# Patient Record
Sex: FEMALE | Race: WHITE | NOT HISPANIC OR LATINO | Employment: STUDENT | URBAN - METROPOLITAN AREA
[De-identification: names, ages, dates, MRNs, and addresses within clinical notes are randomized per-mention and may not be internally consistent; named-entity substitution may affect disease eponyms.]

---
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
## 2023-05-02 ENCOUNTER — TELEPHONE (OUTPATIENT)
Dept: OBSTETRICS AND GYNECOLOGY | Age: 18
End: 2023-05-02
Payer: COMMERCIAL

## 2023-05-02 NOTE — TELEPHONE ENCOUNTER
Regarding: Pregnant and on Zoloft   Contact: [phone]  ----- Message from Amanda Spencer sent at 5/2/2023  8:11 AM EDT -----       ----- Message from Marce Galvan to Glenis Bautista MD sent at 5/1/2023  5:51 PM -----   Hey, I just found out I’m pregnant with baby number two. I got prescribed Zoloft after my first because I was dealing with depression and I’m still on it. I’m wondering what are the risks that come with that. I’m worried and want to know all I can. If the benefits outweigh the risk, I would like to stay on it because I’ve had severe anxiety most of my life. And being on medication has really change the quality of my life.

## 2023-05-04 ENCOUNTER — TELEPHONE (OUTPATIENT)
Dept: OBSTETRICS AND GYNECOLOGY | Age: 18
End: 2023-05-04
Payer: COMMERCIAL

## 2023-05-04 NOTE — TELEPHONE ENCOUNTER
Michele Pt    Pt calling stating she has a positive HPT and unknown date of lmp. Please advise for scheduling

## 2023-06-07 ENCOUNTER — OFFICE VISIT (OUTPATIENT)
Dept: OBSTETRICS AND GYNECOLOGY | Age: 18
End: 2023-06-07
Payer: COMMERCIAL

## 2023-06-07 VITALS
SYSTOLIC BLOOD PRESSURE: 128 MMHG | BODY MASS INDEX: 37.45 KG/M2 | DIASTOLIC BLOOD PRESSURE: 74 MMHG | WEIGHT: 233 LBS | HEIGHT: 66 IN

## 2023-06-07 DIAGNOSIS — Z98.891 H/O: C-SECTION: ICD-10-CM

## 2023-06-07 DIAGNOSIS — Z34.90 EARLY STAGE OF PREGNANCY: Primary | ICD-10-CM

## 2023-06-07 RX ORDER — FEXOFENADINE HYDROCHLORIDE 60 MG/1
60 TABLET, FILM COATED ORAL DAILY
COMMUNITY

## 2023-06-07 RX ORDER — DOXYLAMINE SUCCINATE AND PYRIDOXINE HYDROCHLORIDE 20; 20 MG/1; MG/1
1 TABLET, EXTENDED RELEASE ORAL 2 TIMES DAILY
Qty: 60 TABLET | Refills: 1 | Status: SHIPPED | OUTPATIENT
Start: 2023-06-07

## 2023-06-07 NOTE — Clinical Note
I'm not sure what to make of the finding in her u/s, I suggested a rpt u/s in 2 wks with her new ob visit for re evaluation. Lmk if there is anything else you would suggest

## 2023-06-07 NOTE — PROGRESS NOTES
"Subjective     Chief Complaint   Patient presents with    Possible Pregnancy     pregnancy confirmation with ultrasound (unsure of dates) ovulation predictor date was 2023       Marce Galvan is a 18 y.o.  whose LMP is No LMP recorded (lmp unknown). Patient is pregnant. presents for early ob scan    Pt of Dr Bautista  Previous pt of Dr Gill    Had a +ovulation test on , +UPT on the   She has only had one period since delivering her son in 2022  That pregnancy was uneventful but a csection was done d/t failure to progress  Placenta pathology showed chorioamnionitis, meconium and an occult abruption   SCANNED PATHOLOGY (2022)     She has no significant med history  Is accompanied by JJ and her son    Has some nausea and requests medication  Is taking zoloft and has discussed this with Dr Bautista    No Additional Complaints Reported    The following portions of the patient's history were reviewed and updated as appropriate:no additional history reviewed, vital signs, allergies, current medications, past medical history, past social history, past surgical history, and problem list      Review of Systems   Pertinent items are noted in HPI.     Objective      /74   Ht 167.6 cm (66\")   Wt 106 kg (233 lb)   LMP  (LMP Unknown)   Breastfeeding Yes   BMI 37.61 kg/m²     Physical Exam    General:   alert, comfortable, and no distress   Heart: Not performed today   Lungs: Not performed today.   Breast: Not performed today   Neck: Not performed today   Abdomen: Not performed today   CVA: Not performed today   Pelvis: Not performed today   Extremities: Not performed today   Neurologic: Not performed today   Psychiatric: Normal affect, judgement, and mood       Lab Review   Labs: No data reviewed    Imaging   Ultrasound - Pelvic Vaginal    Intrauterine pregnancy at Unknown  Fetal heart rate: 177  Basis for EDC: Ultrasound   FRITZ is  1/10/2024  Left ovary with likely CL noted that measures 3.8 " cm  Hyperechoic area with vascularity seen near DENISHA and cervix    Assessment & Plan     ASSESSMENT  1. Early stage of pregnancy    2. H/O:           PLAN  1.   Orders Placed This Encounter   Procedures    Urine Culture - , Urine, Random Void       2. Medications prescribed this encounter:        New Medications Ordered This Visit   Medications    sertraline (Zoloft) 50 MG tablet     Sig: Take 1 tablet by mouth Every Night.     Dispense:  30 tablet     Refill:  12    Doxylamine-Pyridoxine ER (Bonjesta) 20-20 MG tablet controlled-release     Sig: Take 1 tablet by mouth 2 (Two) Times a Day.     Dispense:  60 tablet     Refill:  1       3. Refilled zoloft and sent in bonjesta. Disc small, frequent meals. New ob labs will be done anv with  her NIPS testing. Urine culture sent today. 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 No real restrictions from activity needed at this time unless pt notes back pain or other issues. New ob folder given. Will plan rpt pelvic u/s with next visit as well  Pap not indicated    Follow up: 2 week(s)    RADHA Rayo  2023

## 2023-06-09 LAB
BACTERIA UR CULT: NORMAL
BACTERIA UR CULT: NORMAL

## 2023-06-23 PROBLEM — Z34.90 PREGNANCY: Status: ACTIVE | Noted: 2023-06-23

## 2023-06-23 PROBLEM — Z87.59 HISTORY OF OLIGOHYDRAMNIOS: Status: ACTIVE | Noted: 2023-06-23

## 2023-06-23 PROBLEM — Z98.891 PREVIOUS CESAREAN SECTION: Status: ACTIVE | Noted: 2023-06-23

## 2023-07-24 ENCOUNTER — ROUTINE PRENATAL (OUTPATIENT)
Dept: OBSTETRICS AND GYNECOLOGY | Age: 18
End: 2023-07-24
Payer: COMMERCIAL

## 2023-07-24 VITALS — DIASTOLIC BLOOD PRESSURE: 70 MMHG | SYSTOLIC BLOOD PRESSURE: 122 MMHG | BODY MASS INDEX: 37.28 KG/M2 | WEIGHT: 231 LBS

## 2023-07-24 DIAGNOSIS — Z98.891 PREVIOUS CESAREAN SECTION: ICD-10-CM

## 2023-07-24 DIAGNOSIS — Z13.89 SCREENING FOR BLOOD OR PROTEIN IN URINE: ICD-10-CM

## 2023-07-24 DIAGNOSIS — Z87.59 HISTORY OF OLIGOHYDRAMNIOS: ICD-10-CM

## 2023-07-24 DIAGNOSIS — Z34.82 PRENATAL CARE, SUBSEQUENT PREGNANCY IN SECOND TRIMESTER: Primary | ICD-10-CM

## 2023-07-24 LAB
BILIRUB BLD-MCNC: NEGATIVE MG/DL
GLUCOSE UR STRIP-MCNC: NEGATIVE MG/DL
KETONES UR QL: NEGATIVE
LEUKOCYTE EST, POC: NEGATIVE
NITRITE UR-MCNC: NEGATIVE MG/ML
PH UR: 6 [PH] (ref 5–8)
PROT UR STRIP-MCNC: NEGATIVE MG/DL
RBC # UR STRIP: NEGATIVE /UL
SP GR UR: 1.01 (ref 1–1.03)
UROBILINOGEN UR QL: NORMAL

## 2023-07-24 PROCEDURE — 0502F SUBSEQUENT PRENATAL CARE: CPT | Performed by: OBSTETRICS & GYNECOLOGY

## 2023-07-24 RX ORDER — DOXYLAMINE SUCCINATE AND PYRIDOXINE HYDROCHLORIDE 20; 20 MG/1; MG/1
1 TABLET, EXTENDED RELEASE ORAL EVERY 12 HOURS SCHEDULED
COMMUNITY
Start: 2023-07-07

## 2023-07-24 NOTE — PROGRESS NOTES
Chief Complaint   Patient presents with    Routine Prenatal Visit     Cc: ob check , patient doing well , knows the gender , no ob complaints        HPI: 18 y.o.  at 15w5d presents for prenatal care     Vitals:    23 0854   BP: 122/70   Weight: 105 kg (231 lb)     Total weight gain for pregnancy:  0.781 kg (1 lb 11.5 oz)    Review of systems:   Gen: negative  CV:     negative  GI: negative  :   negative  MS:    negative  Neuro: negative  Pul: negative    A/P  1. Intrauterine pregnancy at 15w5d   2. Pregnancy Risk:  NORMAL        Diagnoses and all orders for this visit:    1. Prenatal care, subsequent pregnancy in second trimester (Primary)    2. Screening for blood or protein in urine  -     POC Urinalysis Dipstick    3. History of oligohydramnios    4. Previous  section        Nutrition and weight gain were addressed.  Discussed 46% chance of success according to  calculator   -----------------------  PLAN:   4 weeks with anatomy US   Prior  -  consent given for review   NIPt LR       Glenis Bautista MD  2023 18:12 EDT

## 2023-07-25 ENCOUNTER — TELEPHONE (OUTPATIENT)
Dept: OBSTETRICS AND GYNECOLOGY | Age: 18
End: 2023-07-25
Payer: COMMERCIAL

## 2023-07-25 PROBLEM — O98.512 COVID-19 AFFECTING PREGNANCY IN SECOND TRIMESTER: Status: ACTIVE | Noted: 2023-07-25

## 2023-07-25 PROBLEM — U07.1 COVID-19 AFFECTING PREGNANCY IN SECOND TRIMESTER: Status: ACTIVE | Noted: 2023-07-25

## 2023-07-25 NOTE — TELEPHONE ENCOUNTER
----- Message from Deepali Ramirez sent at 7/25/2023  1:12 PM EDT -----  Regarding: FW: Covid   Contact: [phone]  Please see pt msg. Thanks  ----- Message -----  From: Marce Galvan  Sent: 7/25/2023  11:10 AM EDT  To: [facility]  Subject: Covid                                            Hey, I have a fever that started last night so I took a covid test this morning and it’s positive. Since I’m pregnant what are things I need to look out for

## 2023-08-21 ENCOUNTER — ROUTINE PRENATAL (OUTPATIENT)
Dept: OBSTETRICS AND GYNECOLOGY | Age: 18
End: 2023-08-21
Payer: COMMERCIAL

## 2023-08-21 VITALS — SYSTOLIC BLOOD PRESSURE: 120 MMHG | WEIGHT: 230 LBS | BODY MASS INDEX: 37.12 KG/M2 | DIASTOLIC BLOOD PRESSURE: 68 MMHG

## 2023-08-21 DIAGNOSIS — O98.512 COVID-19 AFFECTING PREGNANCY IN SECOND TRIMESTER: ICD-10-CM

## 2023-08-21 DIAGNOSIS — U07.1 COVID-19 AFFECTING PREGNANCY IN SECOND TRIMESTER: ICD-10-CM

## 2023-08-21 DIAGNOSIS — O28.3 ABNORMAL FETAL ULTRASOUND: ICD-10-CM

## 2023-08-21 DIAGNOSIS — Z87.59 HISTORY OF PLACENTAL ABRUPTION: ICD-10-CM

## 2023-08-21 DIAGNOSIS — Z13.89 SCREENING FOR BLOOD OR PROTEIN IN URINE: ICD-10-CM

## 2023-08-21 DIAGNOSIS — Z98.891 PREVIOUS CESAREAN SECTION: ICD-10-CM

## 2023-08-21 DIAGNOSIS — Z34.82 PRENATAL CARE, SUBSEQUENT PREGNANCY IN SECOND TRIMESTER: Primary | ICD-10-CM

## 2023-08-21 LAB
BILIRUB BLD-MCNC: NEGATIVE MG/DL
GLUCOSE UR STRIP-MCNC: NEGATIVE MG/DL
KETONES UR QL: NEGATIVE
LEUKOCYTE EST, POC: NEGATIVE
NITRITE UR-MCNC: NEGATIVE MG/ML
PH UR: 7 [PH] (ref 5–8)
PROT UR STRIP-MCNC: ABNORMAL MG/DL
RBC # UR STRIP: NEGATIVE /UL
SP GR UR: 1.02 (ref 1–1.03)
UROBILINOGEN UR QL: ABNORMAL

## 2023-08-21 PROCEDURE — 0502F SUBSEQUENT PRENATAL CARE: CPT | Performed by: OBSTETRICS & GYNECOLOGY

## 2023-08-21 RX ORDER — ASPIRIN 81 MG/1
81 TABLET ORAL DAILY
Qty: 90 TABLET | Refills: 3 | Status: SHIPPED | OUTPATIENT
Start: 2023-08-21

## 2023-08-21 NOTE — PROGRESS NOTES
Chief Complaint   Patient presents with    Routine Prenatal Visit     Cc: ob check with anatomy scan today , no ob complaints        HPI: 18 y.o.  at 19w5d presents for prenatal care      Vitals:    23 0835   BP: 120/68   Weight: 104 kg (230 lb)     Total weight gain for pregnancy:  0.327 kg (11.5 oz)    Review of systems:   Gen: negative  CV:     negative  GI: negative  :   negative and good fetal movement noted   MS:    negative  Neuro: negative  Pul: negative    A/P  1. Intrauterine pregnancy at 19w5d   2. Pregnancy Risk:  HIGH RISK        Diagnoses and all orders for this visit:    1. Prenatal care, subsequent pregnancy in second trimester (Primary)    2. Screening for blood or protein in urine  -     POC Urinalysis Dipstick    3. Previous  section    4. Incomplete fetal anatomy    5. History of placental abruption    6. COVID-19 affecting pregnancy in second trimester  -     aspirin 81 MG EC tablet; Take 1 tablet by mouth Daily.  Dispense: 90 tablet; Refill: 3        Nutrition and weight gain were addressed.  -----------------------  PLAN:   Return in about 4 weeks (around 2023), or ob check and repeat anatomy.  No lifting > 25 pounds   Work inside extreme heat   Prior  - considering        Glenis Bautista MD  2023 08:51 EDT

## 2023-09-18 ENCOUNTER — ROUTINE PRENATAL (OUTPATIENT)
Dept: OBSTETRICS AND GYNECOLOGY | Age: 18
End: 2023-09-18
Payer: COMMERCIAL

## 2023-09-18 VITALS — DIASTOLIC BLOOD PRESSURE: 80 MMHG | BODY MASS INDEX: 37.45 KG/M2 | WEIGHT: 232 LBS | SYSTOLIC BLOOD PRESSURE: 125 MMHG

## 2023-09-18 DIAGNOSIS — Z87.59 HISTORY OF PLACENTAL ABRUPTION: ICD-10-CM

## 2023-09-18 DIAGNOSIS — Z34.82 PRENATAL CARE, SUBSEQUENT PREGNANCY IN SECOND TRIMESTER: Primary | ICD-10-CM

## 2023-09-18 DIAGNOSIS — Z87.59 HISTORY OF OLIGOHYDRAMNIOS: ICD-10-CM

## 2023-09-18 DIAGNOSIS — Z98.891 PREVIOUS CESAREAN SECTION: ICD-10-CM

## 2023-09-18 DIAGNOSIS — Z13.89 SCREENING FOR BLOOD OR PROTEIN IN URINE: ICD-10-CM

## 2023-09-18 DIAGNOSIS — O28.3 ABNORMAL FETAL ULTRASOUND: ICD-10-CM

## 2023-09-18 LAB
BILIRUB BLD-MCNC: NEGATIVE MG/DL
GLUCOSE UR STRIP-MCNC: NEGATIVE MG/DL
KETONES UR QL: NEGATIVE
LEUKOCYTE EST, POC: NEGATIVE
NITRITE UR-MCNC: NEGATIVE MG/ML
PH UR: 7 [PH] (ref 5–8)
PROT UR STRIP-MCNC: NEGATIVE MG/DL
RBC # UR STRIP: ABNORMAL /UL
SP GR UR: 1.01 (ref 1–1.03)
UROBILINOGEN UR QL: NORMAL

## 2023-09-18 NOTE — PROGRESS NOTES
Chief Complaint   Patient presents with    Routine Prenatal Visit     Cc: ob check with repeat anatomy today , some menstrual cramps occasionally no spotting or other problems        HPI: 18 y.o.  at 23w5d presents for prenatal care     Vitals:    23 0844   BP: 125/80   Weight: 105 kg (232 lb)     Total weight gain for pregnancy:  1.235 kg (2 lb 11.5 oz)    Review of systems:   Gen: negative  CV:     negative  GI: negative  :   good fetal movement noted  and pelvic cramping  MS:    negative  Neuro: negative  Pul: negative    A/P  1. Intrauterine pregnancy at 23w5d   2. Pregnancy Risk:  HIGH RISK        Diagnoses and all orders for this visit:    1. Prenatal care, subsequent pregnancy in second trimester (Primary)    2. Screening for blood or protein in urine  -     POC Urinalysis Dipstick    3. History of oligohydramnios    4. History of placental abruption    5. Incomplete fetal anatomy    6. Previous  section        Nutrition and weight gain were addressed.  -----------------------  PLAN:   Return in about 1 month (around 10/20/2023), or ob check, repeat anatomy, one-hour gtt.  Still with incomplete anatomy today.  Repeat 4 weeks  Normal fetal growth today.    Glenis Bautista MD  2023 09:15 EDT

## 2023-10-20 ENCOUNTER — ROUTINE PRENATAL (OUTPATIENT)
Dept: OBSTETRICS AND GYNECOLOGY | Age: 18
End: 2023-10-20
Payer: COMMERCIAL

## 2023-10-20 VITALS — BODY MASS INDEX: 37.61 KG/M2 | WEIGHT: 233 LBS | SYSTOLIC BLOOD PRESSURE: 120 MMHG | DIASTOLIC BLOOD PRESSURE: 62 MMHG

## 2023-10-20 DIAGNOSIS — Z13.1 SCREENING FOR DIABETES MELLITUS: ICD-10-CM

## 2023-10-20 DIAGNOSIS — U07.1 COVID-19 AFFECTING PREGNANCY IN SECOND TRIMESTER: ICD-10-CM

## 2023-10-20 DIAGNOSIS — Z87.59 HISTORY OF PLACENTAL ABRUPTION: ICD-10-CM

## 2023-10-20 DIAGNOSIS — Z98.891 PREVIOUS CESAREAN SECTION: ICD-10-CM

## 2023-10-20 DIAGNOSIS — O98.512 COVID-19 AFFECTING PREGNANCY IN SECOND TRIMESTER: ICD-10-CM

## 2023-10-20 DIAGNOSIS — Z34.83 PRENATAL CARE, SUBSEQUENT PREGNANCY IN THIRD TRIMESTER: Primary | ICD-10-CM

## 2023-10-20 DIAGNOSIS — O28.3 ABNORMAL FETAL ULTRASOUND: ICD-10-CM

## 2023-10-20 DIAGNOSIS — Z13.89 SCREENING FOR BLOOD OR PROTEIN IN URINE: ICD-10-CM

## 2023-10-20 DIAGNOSIS — Z87.59 HISTORY OF OLIGOHYDRAMNIOS: ICD-10-CM

## 2023-10-20 DIAGNOSIS — Z13.0 SCREENING FOR IRON DEFICIENCY ANEMIA: ICD-10-CM

## 2023-10-20 DIAGNOSIS — Z23 ENCOUNTER FOR ADMINISTRATION OF VACCINE: ICD-10-CM

## 2023-10-20 LAB
BILIRUB BLD-MCNC: NEGATIVE MG/DL
ERYTHROCYTE [DISTWIDTH] IN BLOOD BY AUTOMATED COUNT: 12.9 % (ref 12.3–15.4)
GLUCOSE 1H P 50 G GLC PO SERPL-MCNC: 127 MG/DL (ref 65–139)
GLUCOSE UR STRIP-MCNC: NEGATIVE MG/DL
HCT VFR BLD AUTO: 35.6 % (ref 34–46.6)
HGB BLD-MCNC: 12 G/DL (ref 12–15.9)
KETONES UR QL: NEGATIVE
LEUKOCYTE EST, POC: NEGATIVE
MCH RBC QN AUTO: 29.1 PG (ref 26.6–33)
MCHC RBC AUTO-ENTMCNC: 33.7 G/DL (ref 31.5–35.7)
MCV RBC AUTO: 86.4 FL (ref 79–97)
NITRITE UR-MCNC: NEGATIVE MG/ML
PH UR: 6.5 [PH] (ref 5–8)
PLATELET # BLD AUTO: 286 10*3/MM3 (ref 140–450)
PROT UR STRIP-MCNC: NEGATIVE MG/DL
RBC # BLD AUTO: 4.12 10*6/MM3 (ref 3.77–5.28)
RBC # UR STRIP: NEGATIVE /UL
SP GR UR: 1.02 (ref 1–1.03)
TSH SERPL DL<=0.005 MIU/L-ACNC: 1.29 UIU/ML (ref 0.27–4.2)
UROBILINOGEN UR QL: NORMAL
WBC # BLD AUTO: 7.28 10*3/MM3 (ref 3.4–10.8)

## 2023-10-20 NOTE — PROGRESS NOTES
Chief Complaint   Patient presents with    Routine Prenatal Visit     Cc: ob check with repeat anatomy 1 hr gtt she will hold off on tdap since she got one in 2023 , flu vaccine today , pt reports good FM , no o0b complaints        HPI: 18 y.o.  at 28w2d presents for prenatal care    Vitals:    10/20/23 0914   BP: 120/62   Weight: 106 kg (233 lb)     Total weight gain for pregnancy:  1.688 kg (3 lb 11.5 oz)    Review of systems:   Gen: negative  CV:     negative  GI: negative  :   negative and good fetal movement noted   MS:    negative  Neuro: negative  Pul: negative    A/P  1. Intrauterine pregnancy at 28w2d   2. Pregnancy Risk:  HIGH RISK        Diagnoses and all orders for this visit:    1. Prenatal care, subsequent pregnancy in third trimester (Primary)    2. Screening for blood or protein in urine  -     Gestational Screen 1 Hr ([facility])  -     CBC (No Diff)  -     POC Urinalysis Dipstick    3. Screening for iron deficiency anemia  -     Gestational Screen 1 Hr ([facility])  -     CBC (No Diff)  -     POC Urinalysis Dipstick    4. Screening for diabetes mellitus  -     Gestational Screen 1 Hr ([facility])  -     CBC (No Diff)  -     POC Urinalysis Dipstick    5. Encounter for administration of vaccine  -     Gestational Screen 1 Hr ([facility])  -     CBC (No Diff)  -     POC Urinalysis Dipstick    6. Incomplete fetal anatomy    7. History of oligohydramnios    8. History of placental abruption    9. Previous  section    10. COVID-19 affecting pregnancy in second trimester    Other orders  -     Cancel: Tdap Vaccine Greater Than or Equal To 6yo IM  -     Fluzone (or Fluarix & Flulaval for VFC) >6mos        Nutrition and weight gain were addressed.  -----------------------  PLAN:   Return in about 2 weeks (around 11/3/2023), or ob check.  Prior  section -patient considering TOLAC  -would like to wait and see what her cervix is doing and whether she goes into spontaneous labor.  He is also  very open to a repeat  section.  Flu vaccine today  Normal fetal growth today at 36 percentile  1 hour glucose test today    Glenis Bautista MD  10/20/2023 13:07 EDT

## 2023-11-03 ENCOUNTER — ROUTINE PRENATAL (OUTPATIENT)
Dept: OBSTETRICS AND GYNECOLOGY | Age: 18
End: 2023-11-03
Payer: COMMERCIAL

## 2023-11-03 VITALS — WEIGHT: 235 LBS | SYSTOLIC BLOOD PRESSURE: 120 MMHG | DIASTOLIC BLOOD PRESSURE: 70 MMHG | BODY MASS INDEX: 37.93 KG/M2

## 2023-11-03 DIAGNOSIS — Z34.83 PRENATAL CARE, SUBSEQUENT PREGNANCY IN THIRD TRIMESTER: Primary | ICD-10-CM

## 2023-11-03 DIAGNOSIS — U07.1 COVID-19 AFFECTING PREGNANCY IN SECOND TRIMESTER: ICD-10-CM

## 2023-11-03 DIAGNOSIS — Z98.891 PREVIOUS CESAREAN SECTION: ICD-10-CM

## 2023-11-03 DIAGNOSIS — Z87.59 HISTORY OF OLIGOHYDRAMNIOS: ICD-10-CM

## 2023-11-03 DIAGNOSIS — Z87.59 HISTORY OF PLACENTAL ABRUPTION: ICD-10-CM

## 2023-11-03 DIAGNOSIS — Z13.89 SCREENING FOR BLOOD OR PROTEIN IN URINE: ICD-10-CM

## 2023-11-03 DIAGNOSIS — O98.512 COVID-19 AFFECTING PREGNANCY IN SECOND TRIMESTER: ICD-10-CM

## 2023-11-03 NOTE — PROGRESS NOTES
Chief Complaint   Patient presents with    Routine Prenatal Visit     Cc: ob check , no ob complaints today , reports good FM        HPI: 18 y.o.  at 30w2d presents  for prenatal care    Vitals:    23 1052   BP: 120/70   Weight: 107 kg (235 lb)     Total weight gain for pregnancy:  2.595 kg (5 lb 11.5 oz)    Review of systems:   Gen: negative  CV:     negative  GI: negative  :   negative and good fetal movement noted   MS:    negative  Neuro: negative  Pul: negative    A/P  1. Intrauterine pregnancy at 30w2d   2. Pregnancy Risk:  HIGH RISK        Diagnoses and all orders for this visit:    1. Prenatal care, subsequent pregnancy in third trimester (Primary)    2. Screening for blood or protein in urine  -     POC Urinalysis Dipstick    3. Previous  section    4. History of placental abruption    5. History of oligohydramnios    6. COVID-19 affecting pregnancy in second trimester         labor was discussed.  Warnings were provided.  Nutrition and weight gain were addressed.  -----------------------  PLAN:   Return in about 2 weeks (around 2023), or ob check and growth US.  History of oligo-check growth next visit  History of placental abruption  COVID this pregnancy-continue aspirin  Prior  section-considering TOLAC    Glenis Bautista MD  11/3/2023 11:03 EDT

## 2023-11-17 ENCOUNTER — ROUTINE PRENATAL (OUTPATIENT)
Dept: OBSTETRICS AND GYNECOLOGY | Age: 18
End: 2023-11-17
Payer: COMMERCIAL

## 2023-11-17 VITALS — BODY MASS INDEX: 38.41 KG/M2 | WEIGHT: 238 LBS | DIASTOLIC BLOOD PRESSURE: 70 MMHG | SYSTOLIC BLOOD PRESSURE: 110 MMHG

## 2023-11-17 DIAGNOSIS — Z87.59 HISTORY OF PLACENTAL ABRUPTION: ICD-10-CM

## 2023-11-17 DIAGNOSIS — O98.512 COVID-19 AFFECTING PREGNANCY IN SECOND TRIMESTER: ICD-10-CM

## 2023-11-17 DIAGNOSIS — Z13.89 SCREENING FOR BLOOD OR PROTEIN IN URINE: ICD-10-CM

## 2023-11-17 DIAGNOSIS — Z87.59 HISTORY OF OLIGOHYDRAMNIOS: ICD-10-CM

## 2023-11-17 DIAGNOSIS — Z34.83 PRENATAL CARE, SUBSEQUENT PREGNANCY IN THIRD TRIMESTER: Primary | ICD-10-CM

## 2023-11-17 DIAGNOSIS — Z98.891 PREVIOUS CESAREAN SECTION: ICD-10-CM

## 2023-11-17 DIAGNOSIS — U07.1 COVID-19 AFFECTING PREGNANCY IN SECOND TRIMESTER: ICD-10-CM

## 2023-11-17 LAB
BILIRUB BLD-MCNC: NEGATIVE MG/DL
GLUCOSE UR STRIP-MCNC: NEGATIVE MG/DL
KETONES UR QL: NEGATIVE
LEUKOCYTE EST, POC: NEGATIVE
NITRITE UR-MCNC: NEGATIVE MG/ML
PH UR: 6.5 [PH] (ref 5–8)
PROT UR STRIP-MCNC: NEGATIVE MG/DL
RBC # UR STRIP: NEGATIVE /UL
SP GR UR: 1.02 (ref 1–1.03)
UROBILINOGEN UR QL: NORMAL

## 2023-11-17 NOTE — PROGRESS NOTES
Chief Complaint   Patient presents with    Routine Prenatal Visit     Cc:  ob check with growth Us , patient reports good FM , no ob complaints        HPI: 18 y.o.  at 32w2d presents for prenatal care      Vitals:    23 1024   BP: 110/70   Weight: 108 kg (238 lb)     Total weight gain for pregnancy:  3.956 kg (8 lb 11.5 oz)    Review of systems:   Gen: negative  CV:     negative  GI: negative  :   negative and good fetal movement noted   MS:    negative  Neuro: negative  Pul: negative    A/P  1. Intrauterine pregnancy at 32w2d   2. Pregnancy Risk:  HIGH RISK        Diagnoses and all orders for this visit:    1. Prenatal care, subsequent pregnancy in third trimester (Primary)    2. Screening for blood or protein in urine  -     POC Urinalysis Dipstick  -     Cancel: POC Urinalysis Dipstick    3. History of placental abruption    4. History of oligohydramnios    5. Previous  section    6. COVID-19 affecting pregnancy in second trimester         labor was discussed.  Warnings were provided.  Nutrition and weight gain were addressed.  -----------------------  PLAN:   Return in about 12 days (around 2023), or ob check.  Normal fetal growth today with estimated fetal weight at the 63rd percentile  History of oligo-JOSEFINA 20 cm today   labor warnings      Glenis Bautista MD  2023 10:46 EST

## 2023-11-29 ENCOUNTER — ROUTINE PRENATAL (OUTPATIENT)
Dept: OBSTETRICS AND GYNECOLOGY | Age: 18
End: 2023-11-29
Payer: COMMERCIAL

## 2023-11-29 VITALS — WEIGHT: 238 LBS | SYSTOLIC BLOOD PRESSURE: 112 MMHG | BODY MASS INDEX: 38.41 KG/M2 | DIASTOLIC BLOOD PRESSURE: 60 MMHG

## 2023-11-29 DIAGNOSIS — Z13.89 SCREENING FOR BLOOD OR PROTEIN IN URINE: ICD-10-CM

## 2023-11-29 DIAGNOSIS — Z87.59 HISTORY OF PLACENTAL ABRUPTION: ICD-10-CM

## 2023-11-29 DIAGNOSIS — Z87.59 HISTORY OF OLIGOHYDRAMNIOS: ICD-10-CM

## 2023-11-29 DIAGNOSIS — O34.219 DESIRES VBAC (VAGINAL BIRTH AFTER CESAREAN) TRIAL: ICD-10-CM

## 2023-11-29 DIAGNOSIS — Z34.83 PRENATAL CARE, SUBSEQUENT PREGNANCY IN THIRD TRIMESTER: Primary | ICD-10-CM

## 2023-11-29 DIAGNOSIS — U07.1 COVID-19 AFFECTING PREGNANCY IN SECOND TRIMESTER: ICD-10-CM

## 2023-11-29 DIAGNOSIS — O98.512 COVID-19 AFFECTING PREGNANCY IN SECOND TRIMESTER: ICD-10-CM

## 2023-11-29 DIAGNOSIS — Z98.891 PREVIOUS CESAREAN SECTION: ICD-10-CM

## 2023-11-29 LAB
BILIRUB BLD-MCNC: NEGATIVE MG/DL
GLUCOSE UR STRIP-MCNC: NEGATIVE MG/DL
KETONES UR QL: NEGATIVE
LEUKOCYTE EST, POC: NEGATIVE
NITRITE UR-MCNC: NEGATIVE MG/ML
PH UR: 7 [PH] (ref 5–8)
PROT UR STRIP-MCNC: ABNORMAL MG/DL
RBC # UR STRIP: NEGATIVE /UL
SP GR UR: 1.02 (ref 1–1.03)
UROBILINOGEN UR QL: NORMAL

## 2023-11-29 PROCEDURE — 0502F SUBSEQUENT PRENATAL CARE: CPT | Performed by: OBSTETRICS & GYNECOLOGY

## 2023-11-29 NOTE — PROGRESS NOTES
Chief Complaint   Patient presents with    Routine Prenatal Visit     Cc: ob check , reports good FM , no ob complaints , upd with flu vaccine        HPI: 18 y.o.  at 34w0d presents for prenatal care    Vitals:    23 0920   BP: 112/60   Weight: 108 kg (238 lb)     Total weight gain for pregnancy:  3.956 kg (8 lb 11.5 oz)    Review of systems:   Gen: negative  CV:     negative  GI: negative  :   negative and good fetal movement noted   MS:    negative  Neuro: negative  Pul: negative    A/P  1. Intrauterine pregnancy at 34w0d   2. Pregnancy Risk:  NORMAL        Diagnoses and all orders for this visit:    1. Screening for blood or protein in urine (Primary)  -     POC Urinalysis Dipstick    2. Previous  section    3. COVID-19 affecting pregnancy in second trimester    4. History of placental abruption    5. History of oligohydramnios    6. Desires  (vaginal birth after ) trial        Nutrition and weight gain were addressed.  -----------------------  PLAN:   Return in about 16 days (around 12/15/2023).  Obesity-check growth next visit  History of oligo and placental abruption-start BPP's at 36 weeks  Will discuss delivery plan at  appointment  Glenis Bautista MD  2023 09:32 EST

## 2023-12-11 ENCOUNTER — TELEPHONE (OUTPATIENT)
Dept: OBSTETRICS AND GYNECOLOGY | Age: 18
End: 2023-12-11
Payer: COMMERCIAL

## 2023-12-11 NOTE — TELEPHONE ENCOUNTER
If contractions, loss of fluid, decreased fetal movement, or vaginal bleeding recommend evaluation in labor and delivery to rule out labor.

## 2023-12-11 NOTE — TELEPHONE ENCOUNTER
Regarding: More frequent [place] mittal   Contact: [phone]  ----- Message from Amanda Spencer sent at 12/11/2023  8:53 AM EST -----       ----- Message from Marce Galvan to Glenis Bautista MD sent at 12/8/2023  1:19 PM -----   I’ve been having a lot of what I think are [place] Mittal the past several days. They happen when I’m doing stuff like walking around the store or doing chores around the house. They have also been happening when I’m moving around a lot at work. The pain/ discomfort stops when I sit down and rest. I have 4 more days of work and I was wondering if you think it’s ok to continue or if I need to stop today. I was also wondering if you wanted me to come in to make sure everything is ok. It feels different than the Jeremías Mittal I’ve had before so in only guessing that’s what it is I just know it’s not an actual contraction. I just wanted to check in with you because the internet is no help lol

## 2023-12-11 NOTE — TELEPHONE ENCOUNTER
Pt called back and denied fluid loss or vaginal bleeding and not having contractions, denied decreased movement, advised pt if she had any to go to L&D

## 2023-12-15 ENCOUNTER — ROUTINE PRENATAL (OUTPATIENT)
Dept: OBSTETRICS AND GYNECOLOGY | Age: 18
End: 2023-12-15
Payer: COMMERCIAL

## 2023-12-15 VITALS — SYSTOLIC BLOOD PRESSURE: 120 MMHG | DIASTOLIC BLOOD PRESSURE: 68 MMHG | BODY MASS INDEX: 38.9 KG/M2 | WEIGHT: 241 LBS

## 2023-12-15 DIAGNOSIS — Z36.85 ENCOUNTER FOR ANTENATAL SCREENING FOR STREPTOCOCCUS B: ICD-10-CM

## 2023-12-15 DIAGNOSIS — U07.1 COVID-19 AFFECTING PREGNANCY IN SECOND TRIMESTER: ICD-10-CM

## 2023-12-15 DIAGNOSIS — Z3A.36 36 WEEKS GESTATION OF PREGNANCY: ICD-10-CM

## 2023-12-15 DIAGNOSIS — Z98.891 PREVIOUS CESAREAN SECTION: ICD-10-CM

## 2023-12-15 DIAGNOSIS — O34.219 DESIRES VBAC (VAGINAL BIRTH AFTER CESAREAN) TRIAL: ICD-10-CM

## 2023-12-15 DIAGNOSIS — Z87.59 HISTORY OF PLACENTAL ABRUPTION: ICD-10-CM

## 2023-12-15 DIAGNOSIS — Z87.59 HISTORY OF OLIGOHYDRAMNIOS: ICD-10-CM

## 2023-12-15 DIAGNOSIS — O98.512 COVID-19 AFFECTING PREGNANCY IN SECOND TRIMESTER: ICD-10-CM

## 2023-12-15 DIAGNOSIS — Z34.83 PRENATAL CARE, SUBSEQUENT PREGNANCY IN THIRD TRIMESTER: Primary | ICD-10-CM

## 2023-12-15 LAB
GLUCOSE UR STRIP-MCNC: NEGATIVE MG/DL
PROT UR STRIP-MCNC: NEGATIVE MG/DL

## 2023-12-17 LAB — GP B STREP DNA SPEC QL NAA+PROBE: NEGATIVE

## 2023-12-22 ENCOUNTER — ROUTINE PRENATAL (OUTPATIENT)
Dept: OBSTETRICS AND GYNECOLOGY | Age: 18
End: 2023-12-22
Payer: COMMERCIAL

## 2023-12-22 VITALS — BODY MASS INDEX: 38.74 KG/M2 | WEIGHT: 240 LBS | SYSTOLIC BLOOD PRESSURE: 118 MMHG | DIASTOLIC BLOOD PRESSURE: 78 MMHG

## 2023-12-22 DIAGNOSIS — Z13.89 SCREENING FOR BLOOD OR PROTEIN IN URINE: ICD-10-CM

## 2023-12-22 DIAGNOSIS — U07.1 COVID-19 AFFECTING PREGNANCY IN SECOND TRIMESTER: ICD-10-CM

## 2023-12-22 DIAGNOSIS — Z87.59 HISTORY OF PLACENTAL ABRUPTION: ICD-10-CM

## 2023-12-22 DIAGNOSIS — Z34.83 PRENATAL CARE, SUBSEQUENT PREGNANCY IN THIRD TRIMESTER: Primary | ICD-10-CM

## 2023-12-22 DIAGNOSIS — O34.219 DESIRES VBAC (VAGINAL BIRTH AFTER CESAREAN) TRIAL: ICD-10-CM

## 2023-12-22 DIAGNOSIS — Z98.891 PREVIOUS CESAREAN SECTION: ICD-10-CM

## 2023-12-22 DIAGNOSIS — Z87.59 HISTORY OF OLIGOHYDRAMNIOS: ICD-10-CM

## 2023-12-22 DIAGNOSIS — O98.512 COVID-19 AFFECTING PREGNANCY IN SECOND TRIMESTER: ICD-10-CM

## 2023-12-22 NOTE — PROGRESS NOTES
Chief Complaint   Patient presents with    Routine Prenatal Visit     Cc: ob check with growth Us , reports good FM , GBS neg , no ob complaints   Upd with vaccines        HPI: 18 y.o.  at 37w2d presents for prenatal care    Vitals:    23 1018   BP: 118/78   Weight: 109 kg (240 lb)     Total weight gain for pregnancy:  4.863 kg (10 lb 11.5 oz)    Review of systems:   Gen: negative  CV:     negative  GI: negative  :   negative and good fetal movement noted   MS:    negative  Neuro: negative  Pul: negative    A/P  1. Intrauterine pregnancy at 37w2d   2. Pregnancy Risk:  HIGH RISK        Diagnoses and all orders for this visit:    1. Prenatal care, subsequent pregnancy in third trimester (Primary)    2. Screening for blood or protein in urine  -     POC Urinalysis Dipstick    3. History of placental abruption    4. History of oligohydramnios    5. Desires  (vaginal birth after ) trial    6. Previous  section    7. COVID-19 affecting pregnancy in second trimester        Nutrition and weight gain were addressed.  -----------------------  PLAN:   Return in about 1 week (around 2023), or ob check and BPP.  COVID this pregnancy -BPP today 8 out of 8  History of oligo-normal JOSEFINA today  Labor warnings/fetal kick counts  GBS negative  Birth plan reviewed  Glenis Bautista MD  2023 10:36 EST

## 2023-12-29 ENCOUNTER — ROUTINE PRENATAL (OUTPATIENT)
Dept: OBSTETRICS AND GYNECOLOGY | Age: 18
End: 2023-12-29
Payer: COMMERCIAL

## 2023-12-29 VITALS — WEIGHT: 242 LBS | SYSTOLIC BLOOD PRESSURE: 112 MMHG | BODY MASS INDEX: 39.06 KG/M2 | DIASTOLIC BLOOD PRESSURE: 72 MMHG

## 2023-12-29 DIAGNOSIS — Z87.59 HISTORY OF PLACENTAL ABRUPTION: ICD-10-CM

## 2023-12-29 DIAGNOSIS — Z98.891 PREVIOUS CESAREAN SECTION: ICD-10-CM

## 2023-12-29 DIAGNOSIS — O34.219 DESIRES VBAC (VAGINAL BIRTH AFTER CESAREAN) TRIAL: ICD-10-CM

## 2023-12-29 DIAGNOSIS — O98.512 COVID-19 AFFECTING PREGNANCY IN SECOND TRIMESTER: ICD-10-CM

## 2023-12-29 DIAGNOSIS — Z3A.38 38 WEEKS GESTATION OF PREGNANCY: Primary | ICD-10-CM

## 2023-12-29 DIAGNOSIS — U07.1 COVID-19 AFFECTING PREGNANCY IN SECOND TRIMESTER: ICD-10-CM

## 2023-12-29 DIAGNOSIS — Z87.59 HISTORY OF OLIGOHYDRAMNIOS: ICD-10-CM

## 2023-12-29 LAB
GLUCOSE UR STRIP-MCNC: NEGATIVE MG/DL
PROT UR STRIP-MCNC: ABNORMAL MG/DL

## 2023-12-29 NOTE — PROGRESS NOTES
Chief Complaint   Patient presents with    Routine Prenatal Visit     38 weeks, US today  CC:  no problems       HPI: 18 y.o.  at 38w2d     Doing well  Reports good FM  Denies LOF, bleeding or ctx's  Pt of Dr. Bautista     Vitals:    23 1352   BP: 112/72   Weight: 110 kg (242 lb)       ROS:  GI:  Negative  : Negative  Pulmonary: Negative     A/P  1. Intrauterine pregnancy at 38w2d   2. Pregnancy Risk:  NORMAL    Diagnoses and all orders for this visit:    1. 38 weeks gestation of pregnancy (Primary)  -     POC Urinalysis Dipstick    2. Desires  (vaginal birth after ) trial    3. History of placental abruption    4. History of oligohydramnios    5. Previous  section    6. COVID-19 affecting pregnancy in second trimester        -----------------------  PLAN:   BPP   Labor warnings/FKC discussed  Return for Next scheduled follow up.      Triny Melton, APRN  2023 14:05 EST

## 2024-01-03 ENCOUNTER — ROUTINE PRENATAL (OUTPATIENT)
Dept: OBSTETRICS AND GYNECOLOGY | Age: 19
End: 2024-01-03
Payer: COMMERCIAL

## 2024-01-03 VITALS — WEIGHT: 243 LBS | BODY MASS INDEX: 39.22 KG/M2 | DIASTOLIC BLOOD PRESSURE: 70 MMHG | SYSTOLIC BLOOD PRESSURE: 108 MMHG

## 2024-01-03 DIAGNOSIS — Z34.83 PRENATAL CARE, SUBSEQUENT PREGNANCY IN THIRD TRIMESTER: Primary | ICD-10-CM

## 2024-01-03 DIAGNOSIS — U07.1 COVID-19 AFFECTING PREGNANCY IN SECOND TRIMESTER: ICD-10-CM

## 2024-01-03 DIAGNOSIS — Z87.59 HISTORY OF PLACENTAL ABRUPTION: ICD-10-CM

## 2024-01-03 DIAGNOSIS — Z98.891 PREVIOUS CESAREAN SECTION: ICD-10-CM

## 2024-01-03 DIAGNOSIS — O32.8XX0 FOOTLING BREECH PRESENTATION, SINGLE OR UNSPECIFIED FETUS: ICD-10-CM

## 2024-01-03 DIAGNOSIS — Z87.59 HISTORY OF OLIGOHYDRAMNIOS: ICD-10-CM

## 2024-01-03 DIAGNOSIS — O98.512 COVID-19 AFFECTING PREGNANCY IN SECOND TRIMESTER: ICD-10-CM

## 2024-01-03 DIAGNOSIS — Z13.89 SCREENING FOR HEMATURIA OR PROTEINURIA: ICD-10-CM

## 2024-01-03 PROBLEM — O34.219 DESIRES VBAC (VAGINAL BIRTH AFTER CESAREAN) TRIAL: Status: RESOLVED | Noted: 2023-11-29 | Resolved: 2024-01-03

## 2024-01-03 LAB
BILIRUB BLD-MCNC: NEGATIVE MG/DL
GLUCOSE UR STRIP-MCNC: NEGATIVE MG/DL
KETONES UR QL: NEGATIVE
LEUKOCYTE EST, POC: ABNORMAL
NITRITE UR-MCNC: NEGATIVE MG/ML
PH UR: 7 [PH] (ref 5–8)
PROT UR STRIP-MCNC: NEGATIVE MG/DL
RBC # UR STRIP: NEGATIVE /UL
SP GR UR: 1.01 (ref 1–1.03)
UROBILINOGEN UR QL: ABNORMAL

## 2024-01-03 NOTE — PROGRESS NOTES
Chief Complaint   Patient presents with    Routine Prenatal Visit     39 weeks, BPP today, pt denies any contractions        HPI: 18 y.o.  at 39w0d presents for prenatal care.     Vitals:    24 0927   BP: 108/70   Weight: 110 kg (243 lb)     Total weight gain for pregnancy:  6.224 kg (13 lb 11.5 oz)    Review of systems:   Gen: negative  CV:     negative  GI: negative  :   negative and good fetal movement noted   MS:    negative  Neuro: negative  Pul: negative    A/P  1. Intrauterine pregnancy at 39w0d   2. Pregnancy Risk:  HIGH RISK        Diagnoses and all orders for this visit:    1. Prenatal care, subsequent pregnancy in third trimester (Primary)    2. Screening for hematuria or proteinuria  -     POC Urinalysis Dipstick    3. Previous  section  -     Case Request    4. History of placental abruption    5. History of oligohydramnios    6. Footling breech presentation, single or unspecified fetus  -     Case Request    7. COVID-19 affecting pregnancy in second trimester        Routine labor warnings were discussed and indications for L & D f/u including bleeding, regular contractions, decreased fetal movement or/and rupture of membranes.   -----------------------  PLAN:   Return in about 12 days (around 1/15/2024), or PP incision check.  Fetal presentation footling breech on ultrasound today.  Patient had desired trial of labor after  section.  Desires to proceed with primary section now we will schedule tomorrow.  Strict labor warnings to proceed to labor and delivery if any contractions or loss of fluid.  BPP today 8 out of 8    Glenis Bautista MD  1/3/2024 10:08 EST

## 2024-01-04 ENCOUNTER — ANESTHESIA (OUTPATIENT)
Dept: LABOR AND DELIVERY | Facility: HOSPITAL | Age: 19
End: 2024-01-04
Payer: COMMERCIAL

## 2024-01-04 ENCOUNTER — HOSPITAL ENCOUNTER (INPATIENT)
Facility: HOSPITAL | Age: 19
LOS: 2 days | Discharge: HOME | End: 2024-01-06
Attending: OBSTETRICS & GYNECOLOGY | Admitting: OBSTETRICS & GYNECOLOGY
Payer: COMMERCIAL

## 2024-01-04 ENCOUNTER — ANESTHESIA EVENT (OUTPATIENT)
Dept: LABOR AND DELIVERY | Facility: HOSPITAL | Age: 19
End: 2024-01-04
Payer: COMMERCIAL

## 2024-01-04 PROBLEM — Z98.891 S/P CESAREAN SECTION: Status: ACTIVE | Noted: 2024-01-04

## 2024-01-04 LAB
ABO GROUP BLD: NORMAL
ALBUMIN SERPL-MCNC: 4 G/DL (ref 3.5–5.2)
ALBUMIN/GLOB SERPL: 1.5 G/DL
ALP SERPL-CCNC: 146 U/L (ref 39–117)
ALT SERPL W P-5'-P-CCNC: 28 U/L (ref 1–33)
ANION GAP SERPL CALCULATED.3IONS-SCNC: 11 MMOL/L (ref 5–15)
AST SERPL-CCNC: 13 U/L (ref 1–32)
BASOPHILS # BLD AUTO: 0.01 10*3/MM3 (ref 0–0.2)
BASOPHILS NFR BLD AUTO: 0.1 % (ref 0–1.5)
BILIRUB SERPL-MCNC: 0.3 MG/DL (ref 0–1.2)
BLD GP AB SCN SERPL QL: NEGATIVE
BUN SERPL-MCNC: 6 MG/DL (ref 6–20)
BUN/CREAT SERPL: 11.3 (ref 7–25)
CALCIUM SPEC-SCNC: 9.4 MG/DL (ref 8.6–10.5)
CHLORIDE SERPL-SCNC: 107 MMOL/L (ref 98–107)
CO2 SERPL-SCNC: 18 MMOL/L (ref 22–29)
CREAT SERPL-MCNC: 0.53 MG/DL (ref 0.57–1)
DEPRECATED RDW RBC AUTO: 40.4 FL (ref 37–54)
EGFRCR SERPLBLD CKD-EPI 2021: 136 ML/MIN/1.73
EOSINOPHIL # BLD AUTO: 0.02 10*3/MM3 (ref 0–0.4)
EOSINOPHIL NFR BLD AUTO: 0.3 % (ref 0.3–6.2)
ERYTHROCYTE [DISTWIDTH] IN BLOOD BY AUTOMATED COUNT: 12.9 % (ref 12.3–15.4)
GLOBULIN UR ELPH-MCNC: 2.6 GM/DL
GLUCOSE SERPL-MCNC: 98 MG/DL (ref 65–99)
HCT VFR BLD AUTO: 34.8 % (ref 34–46.6)
HGB BLD-MCNC: 11.9 G/DL (ref 12–15.9)
IMM GRANULOCYTES # BLD AUTO: 0.03 10*3/MM3 (ref 0–0.05)
IMM GRANULOCYTES NFR BLD AUTO: 0.4 % (ref 0–0.5)
LYMPHOCYTES # BLD AUTO: 1.45 10*3/MM3 (ref 0.7–3.1)
LYMPHOCYTES NFR BLD AUTO: 18.2 % (ref 19.6–45.3)
MCH RBC QN AUTO: 29.4 PG (ref 26.6–33)
MCHC RBC AUTO-ENTMCNC: 34.2 G/DL (ref 31.5–35.7)
MCV RBC AUTO: 85.9 FL (ref 79–97)
MONOCYTES # BLD AUTO: 0.53 10*3/MM3 (ref 0.1–0.9)
MONOCYTES NFR BLD AUTO: 6.6 % (ref 5–12)
NEUTROPHILS NFR BLD AUTO: 5.93 10*3/MM3 (ref 1.7–7)
NEUTROPHILS NFR BLD AUTO: 74.4 % (ref 42.7–76)
NRBC BLD AUTO-RTO: 0 /100 WBC (ref 0–0.2)
PLATELET # BLD AUTO: 296 10*3/MM3 (ref 140–450)
PMV BLD AUTO: 10.2 FL (ref 6–12)
POTASSIUM SERPL-SCNC: 4.1 MMOL/L (ref 3.5–5.2)
PROT SERPL-MCNC: 6.6 G/DL (ref 6–8.5)
RBC # BLD AUTO: 4.05 10*6/MM3 (ref 3.77–5.28)
RH BLD: POSITIVE
RPR SER QL: NORMAL
SODIUM SERPL-SCNC: 136 MMOL/L (ref 136–145)
T&S EXPIRATION DATE: NORMAL
WBC NRBC COR # BLD AUTO: 7.97 10*3/MM3 (ref 3.4–10.8)

## 2024-01-04 PROCEDURE — 25010000002 FENTANYL CITRATE (PF) 50 MCG/ML SOLUTION: Performed by: ANESTHESIOLOGY

## 2024-01-04 PROCEDURE — 25810000003 LACTATED RINGERS SOLUTION: Performed by: OBSTETRICS & GYNECOLOGY

## 2024-01-04 PROCEDURE — 25010000002 BUPIVACAINE PF 0.75 % SOLUTION: Performed by: ANESTHESIOLOGY

## 2024-01-04 PROCEDURE — 25010000002 KETOROLAC TROMETHAMINE PER 15 MG: Performed by: OBSTETRICS & GYNECOLOGY

## 2024-01-04 PROCEDURE — 25010000002 ONDANSETRON PER 1 MG: Performed by: ANESTHESIOLOGY

## 2024-01-04 PROCEDURE — 85025 COMPLETE CBC W/AUTO DIFF WBC: CPT | Performed by: OBSTETRICS & GYNECOLOGY

## 2024-01-04 PROCEDURE — 80053 COMPREHEN METABOLIC PANEL: CPT | Performed by: OBSTETRICS & GYNECOLOGY

## 2024-01-04 PROCEDURE — 86901 BLOOD TYPING SEROLOGIC RH(D): CPT | Performed by: OBSTETRICS & GYNECOLOGY

## 2024-01-04 PROCEDURE — 25810000003 LACTATED RINGERS PER 1000 ML: Performed by: OBSTETRICS & GYNECOLOGY

## 2024-01-04 PROCEDURE — 86592 SYPHILIS TEST NON-TREP QUAL: CPT | Performed by: OBSTETRICS & GYNECOLOGY

## 2024-01-04 PROCEDURE — 25010000002 ONDANSETRON PER 1 MG

## 2024-01-04 PROCEDURE — 25010000002 CEFAZOLIN IN DEXTROSE 2000 MG/ 100 ML SOLUTION: Performed by: OBSTETRICS & GYNECOLOGY

## 2024-01-04 PROCEDURE — 59510 CESAREAN DELIVERY: CPT | Performed by: OBSTETRICS & GYNECOLOGY

## 2024-01-04 PROCEDURE — 86900 BLOOD TYPING SEROLOGIC ABO: CPT | Performed by: OBSTETRICS & GYNECOLOGY

## 2024-01-04 PROCEDURE — 86850 RBC ANTIBODY SCREEN: CPT | Performed by: OBSTETRICS & GYNECOLOGY

## 2024-01-04 PROCEDURE — 25010000002 MORPHINE PER 10 MG: Performed by: ANESTHESIOLOGY

## 2024-01-04 PROCEDURE — 25010000002 METHYLERGONOVINE MALEATE PER 0.2 MG: Performed by: OBSTETRICS & GYNECOLOGY

## 2024-01-04 RX ORDER — OXYTOCIN/0.9 % SODIUM CHLORIDE 30/500 ML
125 PLASTIC BAG, INJECTION (ML) INTRAVENOUS CONTINUOUS PRN
Status: COMPLETED | OUTPATIENT
Start: 2024-01-04 | End: 2024-01-04

## 2024-01-04 RX ORDER — SODIUM CHLORIDE 9 MG/ML
40 INJECTION, SOLUTION INTRAVENOUS AS NEEDED
Status: DISCONTINUED | OUTPATIENT
Start: 2024-01-04 | End: 2024-01-05

## 2024-01-04 RX ORDER — CEFAZOLIN SODIUM 2 G/100ML
2000 INJECTION, SOLUTION INTRAVENOUS ONCE
Status: COMPLETED | OUTPATIENT
Start: 2024-01-04 | End: 2024-01-04

## 2024-01-04 RX ORDER — DIPHENHYDRAMINE HCL 25 MG
25 CAPSULE ORAL EVERY 4 HOURS PRN
Status: DISCONTINUED | OUTPATIENT
Start: 2024-01-04 | End: 2024-01-06 | Stop reason: HOSPADM

## 2024-01-04 RX ORDER — AMOXICILLIN 250 MG
1 CAPSULE ORAL 2 TIMES DAILY
Status: DISCONTINUED | OUTPATIENT
Start: 2024-01-04 | End: 2024-01-06 | Stop reason: HOSPADM

## 2024-01-04 RX ORDER — OXYCODONE HYDROCHLORIDE 5 MG/1
5 TABLET ORAL EVERY 4 HOURS PRN
Status: DISCONTINUED | OUTPATIENT
Start: 2024-01-04 | End: 2024-01-06 | Stop reason: HOSPADM

## 2024-01-04 RX ORDER — ACETAMINOPHEN 325 MG/1
650 TABLET ORAL EVERY 6 HOURS
Status: DISCONTINUED | OUTPATIENT
Start: 2024-01-05 | End: 2024-01-06 | Stop reason: HOSPADM

## 2024-01-04 RX ORDER — OXYCODONE HYDROCHLORIDE 10 MG/1
10 TABLET ORAL EVERY 4 HOURS PRN
Status: DISCONTINUED | OUTPATIENT
Start: 2024-01-04 | End: 2024-01-06 | Stop reason: HOSPADM

## 2024-01-04 RX ORDER — MORPHINE SULFATE 4 MG/ML
INJECTION, SOLUTION INTRAMUSCULAR; INTRAVENOUS
Status: COMPLETED | OUTPATIENT
Start: 2024-01-04 | End: 2024-01-04

## 2024-01-04 RX ORDER — DROPERIDOL 2.5 MG/ML
0.62 INJECTION, SOLUTION INTRAMUSCULAR; INTRAVENOUS
Status: DISCONTINUED | OUTPATIENT
Start: 2024-01-04 | End: 2024-01-06 | Stop reason: HOSPADM

## 2024-01-04 RX ORDER — HYDROMORPHONE HYDROCHLORIDE 1 MG/ML
0.5 INJECTION, SOLUTION INTRAMUSCULAR; INTRAVENOUS; SUBCUTANEOUS
Status: CANCELLED | OUTPATIENT
Start: 2024-01-04 | End: 2024-01-04

## 2024-01-04 RX ORDER — FENTANYL CITRATE 50 UG/ML
INJECTION, SOLUTION INTRAMUSCULAR; INTRAVENOUS
Status: COMPLETED | OUTPATIENT
Start: 2024-01-04 | End: 2024-01-04

## 2024-01-04 RX ORDER — CITRIC ACID/SODIUM CITRATE 334-500MG
30 SOLUTION, ORAL ORAL ONCE
Status: COMPLETED | OUTPATIENT
Start: 2024-01-04 | End: 2024-01-04

## 2024-01-04 RX ORDER — ONDANSETRON 2 MG/ML
4 INJECTION INTRAMUSCULAR; INTRAVENOUS ONCE AS NEEDED
Status: DISCONTINUED | OUTPATIENT
Start: 2024-01-04 | End: 2024-01-06 | Stop reason: HOSPADM

## 2024-01-04 RX ORDER — FAMOTIDINE 10 MG/ML
20 INJECTION, SOLUTION INTRAVENOUS ONCE AS NEEDED
Status: DISCONTINUED | OUTPATIENT
Start: 2024-01-04 | End: 2024-01-04 | Stop reason: HOSPADM

## 2024-01-04 RX ORDER — OXYTOCIN/0.9 % SODIUM CHLORIDE 30/500 ML
250 PLASTIC BAG, INJECTION (ML) INTRAVENOUS CONTINUOUS
Status: DISPENSED | OUTPATIENT
Start: 2024-01-04 | End: 2024-01-04

## 2024-01-04 RX ORDER — DIPHENHYDRAMINE HYDROCHLORIDE 50 MG/ML
25 INJECTION INTRAMUSCULAR; INTRAVENOUS EVERY 4 HOURS PRN
Status: DISCONTINUED | OUTPATIENT
Start: 2024-01-04 | End: 2024-01-06 | Stop reason: HOSPADM

## 2024-01-04 RX ORDER — NALOXONE HCL 0.4 MG/ML
0.2 VIAL (ML) INJECTION
Status: CANCELLED | OUTPATIENT
Start: 2024-01-04

## 2024-01-04 RX ORDER — PHENYLEPHRINE HCL IN 0.9% NACL 1 MG/10 ML
SYRINGE (ML) INTRAVENOUS AS NEEDED
Status: DISCONTINUED | OUTPATIENT
Start: 2024-01-04 | End: 2024-01-04 | Stop reason: SURG

## 2024-01-04 RX ORDER — LIDOCAINE HYDROCHLORIDE 10 MG/ML
0.5 INJECTION, SOLUTION INFILTRATION; PERINEURAL ONCE AS NEEDED
Status: DISCONTINUED | OUTPATIENT
Start: 2024-01-04 | End: 2024-01-05

## 2024-01-04 RX ORDER — SODIUM CHLORIDE 0.9 % (FLUSH) 0.9 %
10 SYRINGE (ML) INJECTION AS NEEDED
Status: DISCONTINUED | OUTPATIENT
Start: 2024-01-04 | End: 2024-01-05

## 2024-01-04 RX ORDER — IBUPROFEN 600 MG/1
600 TABLET ORAL EVERY 6 HOURS
Status: DISCONTINUED | OUTPATIENT
Start: 2024-01-05 | End: 2024-01-06 | Stop reason: HOSPADM

## 2024-01-04 RX ORDER — PROMETHAZINE HYDROCHLORIDE 12.5 MG/1
12.5 TABLET ORAL EVERY 4 HOURS PRN
Status: DISCONTINUED | OUTPATIENT
Start: 2024-01-04 | End: 2024-01-06 | Stop reason: HOSPADM

## 2024-01-04 RX ORDER — MORPHINE SULFATE 2 MG/ML
2 INJECTION, SOLUTION INTRAMUSCULAR; INTRAVENOUS
Status: ACTIVE | OUTPATIENT
Start: 2024-01-04 | End: 2024-01-04

## 2024-01-04 RX ORDER — MISOPROSTOL 200 UG/1
800 TABLET ORAL ONCE AS NEEDED
Status: DISCONTINUED | OUTPATIENT
Start: 2024-01-04 | End: 2024-01-04 | Stop reason: HOSPADM

## 2024-01-04 RX ORDER — ACETAMINOPHEN 500 MG
1000 TABLET ORAL EVERY 6 HOURS
Status: COMPLETED | OUTPATIENT
Start: 2024-01-04 | End: 2024-01-05

## 2024-01-04 RX ORDER — ACETAMINOPHEN 500 MG
1000 TABLET ORAL ONCE
Status: COMPLETED | OUTPATIENT
Start: 2024-01-04 | End: 2024-01-04

## 2024-01-04 RX ORDER — ONDANSETRON 2 MG/ML
INJECTION INTRAMUSCULAR; INTRAVENOUS
Status: COMPLETED
Start: 2024-01-04 | End: 2024-01-04

## 2024-01-04 RX ORDER — ENOXAPARIN SODIUM 100 MG/ML
40 INJECTION SUBCUTANEOUS EVERY 12 HOURS
Status: DISCONTINUED | OUTPATIENT
Start: 2024-01-05 | End: 2024-01-06 | Stop reason: HOSPADM

## 2024-01-04 RX ORDER — TRANEXAMIC ACID 10 MG/ML
1000 INJECTION, SOLUTION INTRAVENOUS ONCE AS NEEDED
Status: DISCONTINUED | OUTPATIENT
Start: 2024-01-04 | End: 2024-01-04 | Stop reason: HOSPADM

## 2024-01-04 RX ORDER — CARBOPROST TROMETHAMINE 250 UG/ML
250 INJECTION, SOLUTION INTRAMUSCULAR
Status: DISCONTINUED | OUTPATIENT
Start: 2024-01-04 | End: 2024-01-04 | Stop reason: HOSPADM

## 2024-01-04 RX ORDER — ONDANSETRON 2 MG/ML
4 INJECTION INTRAMUSCULAR; INTRAVENOUS EVERY 6 HOURS PRN
Status: DISCONTINUED | OUTPATIENT
Start: 2024-01-04 | End: 2024-01-06 | Stop reason: HOSPADM

## 2024-01-04 RX ORDER — SODIUM CHLORIDE 0.9 % (FLUSH) 0.9 %
10 SYRINGE (ML) INJECTION EVERY 12 HOURS SCHEDULED
Status: DISCONTINUED | OUTPATIENT
Start: 2024-01-04 | End: 2024-01-05

## 2024-01-04 RX ORDER — KETOROLAC TROMETHAMINE 30 MG/ML
30 INJECTION, SOLUTION INTRAMUSCULAR; INTRAVENOUS ONCE
Status: COMPLETED | OUTPATIENT
Start: 2024-01-04 | End: 2024-01-04

## 2024-01-04 RX ORDER — SIMETHICONE 80 MG
80 TABLET,CHEWABLE ORAL 4 TIMES DAILY PRN
Status: DISCONTINUED | OUTPATIENT
Start: 2024-01-04 | End: 2024-01-06 | Stop reason: HOSPADM

## 2024-01-04 RX ORDER — ONDANSETRON 4 MG/1
4 TABLET, FILM COATED ORAL EVERY 8 HOURS PRN
Status: DISCONTINUED | OUTPATIENT
Start: 2024-01-04 | End: 2024-01-06 | Stop reason: HOSPADM

## 2024-01-04 RX ORDER — BUPIVACAINE HYDROCHLORIDE 7.5 MG/ML
INJECTION, SOLUTION EPIDURAL; RETROBULBAR
Status: COMPLETED | OUTPATIENT
Start: 2024-01-04 | End: 2024-01-04

## 2024-01-04 RX ORDER — FAMOTIDINE 10 MG/ML
20 INJECTION, SOLUTION INTRAVENOUS ONCE AS NEEDED
Status: COMPLETED | OUTPATIENT
Start: 2024-01-04 | End: 2024-01-04

## 2024-01-04 RX ORDER — KETOROLAC TROMETHAMINE 15 MG/ML
15 INJECTION, SOLUTION INTRAMUSCULAR; INTRAVENOUS EVERY 6 HOURS
Status: COMPLETED | OUTPATIENT
Start: 2024-01-04 | End: 2024-01-05

## 2024-01-04 RX ORDER — SODIUM CHLORIDE, SODIUM LACTATE, POTASSIUM CHLORIDE, CALCIUM CHLORIDE 600; 310; 30; 20 MG/100ML; MG/100ML; MG/100ML; MG/100ML
125 INJECTION, SOLUTION INTRAVENOUS CONTINUOUS
Status: DISCONTINUED | OUTPATIENT
Start: 2024-01-04 | End: 2024-01-05

## 2024-01-04 RX ORDER — ONDANSETRON 2 MG/ML
4 INJECTION INTRAMUSCULAR; INTRAVENOUS ONCE AS NEEDED
Status: COMPLETED | OUTPATIENT
Start: 2024-01-04 | End: 2024-01-04

## 2024-01-04 RX ORDER — ACETAMINOPHEN 500 MG
1000 TABLET ORAL EVERY 6 HOURS PRN
COMMUNITY

## 2024-01-04 RX ORDER — OXYTOCIN/0.9 % SODIUM CHLORIDE 30/500 ML
999 PLASTIC BAG, INJECTION (ML) INTRAVENOUS ONCE
Status: COMPLETED | OUTPATIENT
Start: 2024-01-04 | End: 2024-01-04

## 2024-01-04 RX ORDER — METHYLERGONOVINE MALEATE 0.2 MG/ML
200 INJECTION INTRAVENOUS ONCE AS NEEDED
Status: COMPLETED | OUTPATIENT
Start: 2024-01-04 | End: 2024-01-04

## 2024-01-04 RX ADMIN — ACETAMINOPHEN 1000 MG: 500 TABLET ORAL at 19:45

## 2024-01-04 RX ADMIN — CEFAZOLIN SODIUM 2000 MG: 2 INJECTION, SOLUTION INTRAVENOUS at 14:48

## 2024-01-04 RX ADMIN — METHYLERGONOVINE MALEATE 200 MCG: 0.2 INJECTION INTRAVENOUS at 15:22

## 2024-01-04 RX ADMIN — BUPIVACAINE HYDROCHLORIDE 1.6 ML: 7.5 INJECTION, SOLUTION EPIDURAL; RETROBULBAR at 14:54

## 2024-01-04 RX ADMIN — MORPHINE SULFATE 100 MCG: 4 INJECTION, SOLUTION INTRAMUSCULAR; INTRAVENOUS at 14:54

## 2024-01-04 RX ADMIN — SODIUM CHLORIDE, POTASSIUM CHLORIDE, SODIUM LACTATE AND CALCIUM CHLORIDE 1000 ML: 600; 310; 30; 20 INJECTION, SOLUTION INTRAVENOUS at 14:17

## 2024-01-04 RX ADMIN — ONDANSETRON 4 MG: 2 INJECTION INTRAMUSCULAR; INTRAVENOUS at 15:54

## 2024-01-04 RX ADMIN — KETOROLAC TROMETHAMINE 15 MG: 15 INJECTION, SOLUTION INTRAMUSCULAR; INTRAVENOUS at 20:55

## 2024-01-04 RX ADMIN — Medication 999 ML/HR: at 15:19

## 2024-01-04 RX ADMIN — ACETAMINOPHEN 1000 MG: 500 TABLET ORAL at 20:00

## 2024-01-04 RX ADMIN — Medication 150 MCG: at 15:12

## 2024-01-04 RX ADMIN — FAMOTIDINE 20 MG: 10 INJECTION INTRAVENOUS at 14:45

## 2024-01-04 RX ADMIN — Medication 100 MCG: at 15:06

## 2024-01-04 RX ADMIN — SODIUM CHLORIDE, POTASSIUM CHLORIDE, SODIUM LACTATE AND CALCIUM CHLORIDE: 600; 310; 30; 20 INJECTION, SOLUTION INTRAVENOUS at 14:50

## 2024-01-04 RX ADMIN — SERTRALINE 50 MG: 50 TABLET, FILM COATED ORAL at 20:55

## 2024-01-04 RX ADMIN — Medication 125 ML/HR: at 16:46

## 2024-01-04 RX ADMIN — FENTANYL CITRATE 10 MCG: 50 INJECTION, SOLUTION INTRAMUSCULAR; INTRAVENOUS at 14:54

## 2024-01-04 RX ADMIN — DOCUSATE SODIUM 50MG AND SENNOSIDES 8.6MG 1 TABLET: 8.6; 5 TABLET, FILM COATED ORAL at 20:55

## 2024-01-04 RX ADMIN — KETOROLAC TROMETHAMINE 30 MG: 30 INJECTION, SOLUTION INTRAMUSCULAR; INTRAVENOUS at 15:23

## 2024-01-04 RX ADMIN — Medication 100 MCG: at 15:09

## 2024-01-04 RX ADMIN — SODIUM CITRATE AND CITRIC ACID MONOHYDRATE 30 ML: 334; 500 SOLUTION ORAL at 14:32

## 2024-01-04 RX ADMIN — SODIUM CHLORIDE, POTASSIUM CHLORIDE, SODIUM LACTATE AND CALCIUM CHLORIDE: 600; 310; 30; 20 INJECTION, SOLUTION INTRAVENOUS at 15:45

## 2024-01-04 NOTE — ANESTHESIA PROCEDURE NOTES
Spinal Block      Patient reassessed immediately prior to procedure    Start Time: 1/4/2024 2:54 PM  Stop Time: 1/4/2024 3:02 PM  Performed By  Anesthesiologist: Radha Vargas MD  Preanesthetic Checklist  Completed: patient identified, IV checked, site marked, risks and benefits discussed, surgical consent, monitors and equipment checked, pre-op evaluation and timeout performed  Spinal Block Prep:  Patient Position:sitting  Sterile Tech:cap, gloves and sterile barriers  Prep:Chloraprep  Patient Monitoring:EKG, continuous pulse oximetry and blood pressure monitoring    Spinal Block Procedure  Approach:midline  Guidance:palpation technique  Location:L4-L5  Needle Type:Pencan  Needle Gauge:24 G  Paresthesia: no  Fluid Appearance:clear  Medications: fentaNYL citrate (PF) (SUBLIMAZE) injection - Intrathecal   10 mcg - 1/4/2024 2:54:00 PM  Morphine sulfate (PF) injection - Spinal   100 mcg - 1/4/2024 2:54:00 PM  bupivacaine PF (MARCAINE) 0.75 % injection - Spinal   1.6 mL - 1/4/2024 2:54:00 PM   Post Assessment  Patient Tolerance:patient tolerated the procedure well with no apparent complications  Complications no

## 2024-01-04 NOTE — H&P
[facility]  Obstetric History and Physical    Chief Complaint   Patient presents with    Scheduled        Subjective     Patient is a 18 y.o. female  currently at 39w1d, who presents for scheduled repeat  section due to previous  section and footling breech presentation.  Patient denies complaints and reports good fetal movement.  Her prenatal care is complicated by  prior   desires repeat .  Her previous obstetric/gynecological history is noted for is non-contributory.    The following portions of the patients history were reviewed and updated as appropriate: current medications, allergies, past medical history, past surgical history, past family history, past social history, and problem list .       Prenatal Information:  Prenatal Results       Initial Prenatal Labs       Test Value Reference Range Date Time    Hemoglobin  13.5 g/dL 11.1 - 15.9 23 0925    Hematocrit  41.4 % 34.0 - 46.6 23 0925    Platelets  285 x10E3/uL 150 - 450 23 0925    Rubella IgG  8.51 index Immune >0.99 23 0925    Hepatitis B SAg  Negative  Negative 23 0925    Hepatitis C Ab  Non Reactive  Non Reactive 23 0925    RPR  Non Reactive  Non Reactive 23 0925    T. Pallidum Ab         ABO  O   23 0925    Rh  Positive   23 0925    Antibody Screen  Negative  Negative 23 0925    HIV  Non Reactive  Non Reactive 23 0925    Urine Culture  Final report   23 1021    Gonorrhea  Negative  Negative 23 0957    Chlamydia  Negative  Negative 23 0957    TSH  1.290 uIU/mL 0.270 - 4.200 10/20/23 0937       1.010 uIU/mL 0.450 - 4.500 23 0925    HgB A1c   5.4 % 4.8 - 5.6 23 0925    Varicella IgG  2,795 index Immune >165 23 0925    HgB Electrophoresis         Cystic fibrosis                   Fetal testing        Test Value Reference Range Date Time    NIPT        MSAFP        AFP-4                  2nd and 3rd  Trimester       Test Value Reference Range Date Time    Hemoglobin (repeated)  12.0 g/dL 12.0 - 15.9 10/20/23 0937    Hematocrit (repeated)  35.6 % 34.0 - 46.6 10/20/23 0937    Platelets   286 10*3/mm3 140 - 450 10/20/23 0937       285 x10E3/uL 150 - 450 06/23/23 0925    GCT  127 mg/dL 65 - 139 10/20/23 0937    Antibody Screen (repeated)        Third Trimester syphilis screen (repeated)         GTT Fasting        GTT 1 Hr        GTT 2 Hr        GTT 3 Hr        Group B Strep  Negative  Negative 12/15/23 1400              Other testing        Test Value Reference Range Date Time    Parvo IgG         CMV IgG                   Drug Screening       Test Value Reference Range Date Time    Amphetamine Screen        Barbiturate Screen        Benzodiazepine Screen        Methadone Screen        Phencyclidine Screen        Opiates Screen        THC Screen        Cocaine Screen        Propoxyphene Screen        Buprenorphine Screen        Methamphetamine Screen        Oxycodone Screen        Tricyclic Antidepressants Screen                  Legend    ^: Historical                          External Prenatal Results       Pregnancy Outside Results - Transcribed From Office Records - See Scanned Records For Details       Test Value Date Time    ABO  O  06/23/23 0925    Rh  Positive  06/23/23 0925    Antibody Screen  Negative  06/23/23 0925    Varicella IgG  2,795 index 06/23/23 0925    Rubella  8.51 index 06/23/23 0925    Hgb  12.0 g/dL 10/20/23 0937       13.5 g/dL 06/23/23 0925    Hct  35.6 % 10/20/23 0937       41.4 % 06/23/23 0925    Glucose Fasting GTT       Glucose Tolerance Test 1 hour       Glucose Tolerance Test 3 hour       Gonorrhea (discrete)  Negative  06/23/23 0957    Chlamydia (discrete)  Negative  06/23/23 0957    RPR  Non Reactive  06/23/23 0925    VDRL       Syphilis Antibody       HBsAg  Negative  06/23/23 0925    Herpes Simplex Virus PCR       Herpes Simplex VIrus Culture       HIV  Non Reactive  06/23/23 0925     Hep C RNA Quant PCR       Hep C Antibody  Non Reactive  23 0925    AFP  34.8 ng/mL 02/10/22 1525    Group B Strep  Negative  12/15/23 1400    GBS Susceptibility to Clindamycin       GBS Susceptibility to Erythromycin       Fetal Fibronectin       Genetic Testing, Maternal Blood                 Drug Screening       Test Value Date Time    Urine Drug Screen       Amphetamine Screen       Barbiturate Screen       Benzodiazepine Screen       Methadone Screen       Phencyclidine Screen       Opiates Screen       THC Screen       Cocaine Screen       Propoxyphene Screen       Buprenorphine Screen       Methamphetamine Screen       Oxycodone Screen       Tricyclic Antidepressants Screen                 Legend    ^: Historical                             Past OB History:     OB History    Para Term  AB Living   2 1 1 0 0 1   SAB IAB Ectopic Molar Multiple Live Births   0 0 0 0 0 1      # Outcome Date GA Lbr Stevie/2nd Weight Sex Delivery Anes PTL Lv   2 Current            1 Term 22 39w6d  3650 g (8 lb 0.8 oz) M CS-LTranv EPI N MARQUIS      Birth Comments: Panda OR1      Complications: Failure to Progress in First Stage      Name: Joselin      Apgar1: 8  Apgar5: 9      Obstetric Comments   23 - IUP at 9-0 weeks - FRITZ 1/10/24 - [facility]    23 - 19-5 weeks - Normal but INCOMPLETE anatomy - Normal CL - F    23 - 23-5 weeks - EFW 65%, AC 47% - Incomplete anatomy - F    10/20/23 - 28-2 weeks - EFW 36%, AC 54% - [facility]    23 - 32-2 weeks - EFW 63%, AC 78% - F    12/15/23 - 36-2 weeks - EFW 75%, AC 95% BPP  - [facility]        Past Medical History: Past Medical History:   Diagnosis Date    Anxiety     no medications    Oligohydramnios in third trimester 2022      Past Surgical History Past Surgical History:   Procedure Laterality Date     SECTION N/A 2022    Procedure:  SECTION PRIMARY;  Surgeon: Glenis Bautista MD;  Location: [facility] LABOR DELIVERY;  Service:  Obstetrics/Gynecology;  Laterality: N/A;    WISDOM TOOTH EXTRACTION Bilateral 2019      Family History: Family History   Problem Relation Age of Onset    Breast cancer Other     Malig Hyperthermia Neg Hx       Social History:  reports that she has never smoked. She has never been exposed to tobacco smoke. She has never used smokeless tobacco.   reports no history of alcohol use.   reports no history of drug use.        Review of Systems   Constitutional:  Negative for chills, fatigue and fever.   Gastrointestinal:  Negative for abdominal distention and abdominal pain.   Genitourinary:  Negative for pelvic pain, vaginal bleeding, vaginal discharge and vaginal pain.   All other systems reviewed and are negative.        Objective     Vital Signs Range for the last 24 hours  Temperature: Temp:  [98.9 °F (37.2 °C)] 98.9 °F (37.2 °C)   Temp Source: Temp src: Oral   BP: BP: (109)/(61) 109/61   Pulse: Heart Rate:  [105] 105   Respirations: Resp:  [16] 16   SPO2:     O2 Amount (l/min):     O2 Devices     Weight: Weight:  [110 kg (242 lb 9.6 oz)] 110 kg (242 lb 9.6 oz)     Physical Examination: General appearance - alert, well appearing, and in no distress  Abdomen - gravid, soft, nontender, nondistended, no masses or organomegaly  Musculoskeletal - no joint tenderness, deformity or swelling  Extremities - peripheral pulses normal, no pedal edema, no clubbing or cyanosis  Skin - normal coloration and turgor, no rashes, no suspicious skin lesions noted    Presentation: Footling breech   Cervix: Exam by:     Dilation:     Effacement:     Station:       Fetal Heart Rate Assessment   Method:     Beats/min:     Baseline:     Variability:     Accels:     Decels:     Tracing Category:       Uterine Assessment   Method:     Frequency (min):     Ctx Count in 10 min:     Duration:     Intensity:     Intensity by IUPC:     Resting Tone:     Resting Tone by IUPC:     [place] Units:       Laboratory Results:   Results from last 7 days    Lab Units 24  1235   WBC 10*3/mm3 7.97   HEMOGLOBIN g/dL 11.9*   HEMATOCRIT % 34.8   PLATELETS 10*3/mm3 296         Assessment & Plan       Previous  section    Pregnancy    Footling breech presentation      Assessment & Plan    Assessment:  1.  Intrauterine pregnancy at 39w1d gestation with reactive, reassuring fetal status.    2.  Previous  and footling breech presentation  3.  Obstetrical history significant for is non-contributory.  4.  GBS status:   Strep Gp B DANNY   Date Value Ref Range Status   12/15/2023 Negative Negative Final     Comment:     Centers for Disease Control and Prevention (CDC) and American Congress  of Obstetricians and Gynecologists (ACOG) guidelines for prevention of   group B streptococcal (GBS) disease specify co-collection of  a vaginal and rectal swab specimen to maximize sensitivity of GBS  detection. Per the CDC and ACOG, swabbing both the lower vagina and  rectum substantially increases the yield of detection compared with  sampling the vagina alone.  Penicillin G, ampicillin, or cefazolin are indicated for intrapartum  prophylaxis of  GBS colonization. Reflex susceptibility  testing should be performed prior to use of clindamycin only on GBS  isolates from penicillin-allergic women who are considered a high risk  for anaphylaxis. Treatment with vancomycin without additional testing  is warranted if resistance to clindamycin is noted.         Plan:  1. Repeat  scheduled  2. Plan of care has been reviewed with patient and .  3.  Risks, benefits of treatment plan have been discussed.  4.  All questions have been answered.        Glenis Bautista MD  2024  14:11 EST

## 2024-01-04 NOTE — OP NOTE
[facility]     2024    Patient:Marce Galvan   MR#:[id number]     Section Procedure Note    Indications: malpresentation: Footling breech and previous  section low transverse    Pre-operative Diagnosis: Intrauterine pregnancy at 39w1d    Post-operative Diagnosis: same    Procedure:    Repeat low transverse  section       Surgeon: Glenis Bautista MD     Assistants: Alexi Tillman MD    Anesthesia: Spinal anesthesia    Prenatal care problem list:    Patient Active Problem List   Diagnosis    Previous  section    Pregnancy    History of oligohydramnios    COVID-19 affecting pregnancy in second trimester    History of placental abruption    Footling breech presentation    S/P  section       Procedure Details   The patient was seen in the LDR preoperatively. The risks, benefits, complications, treatment options, and expected outcomes were discussed with the patient.  The patient concurred with the proposed plan, giving informed consent.  The site of surgery is discussed. The patient was taken to Operating Room # 1, identified as Marce Galvan and the procedure verified as  Delivery. A Time Out was held and the above information confirmed.    After induction of anesthesia, the patient was draped and prepped in the usual sterile manner. A Pfannenstiel incision was made and carried down through the subcutaneous tissue to the fascia. Fascial incision was made and extended transversely. The fascia was  from the underlying rectus tissue superiorly and inferiorly. The peritoneum was identified and entered. Peritoneal incision was extended longitudinally. The utero-vesical peritoneal reflection was incised transversely and the bladder flap was bluntly freed from the lower uterine segment. A low transverse uterine incision was made. Delivered from transverse presentation by grasping feet was a male  fetus 3920 g (8 lb 10.3  oz)  with Apgar scores of 8   at one minute and 9  at five minutes. After the umbilical cord was clamped and cut cord blood was obtained for evaluation. The placenta was removed intact and appeared normal. The uterine outline, tubes and ovaries appeared normal.  The uterine incision was closed with two layers running locked sutures of 0 Monocryl. Hemostasis was observed.      The peritoneum was then reapproximated with 3-0 Monocryl.  Fascia was then reapproximated with running sutures of 0 Vicryl. The subcutaneous layer was reapproximated with 3-0 Monocryl.  Skin was reapproximated with 4-0 monocryl in a subcuticular fashion.     Instrument, sponge, and needle counts were correct prior the abdominal closure and at the conclusion of the case.     Assistant: Adriano Tillman MD  was responsible for performing the following activities: Retraction, Suction, and Irrigation and their skilled assistance was necessary for the success of this case.    Findings:  Normal uterus, tubes, and ovaries.    Quantatative Blood Loss:                       Specimens:  Placenta            Complications:  None; patient tolerated the procedure well.           Disposition: PACU - hemodynamically stable.           Condition: stable    Attending Attestation: I was present and scrubbed for the entire procedure.      Glenis Bautista MD  2024 16:19 EST

## 2024-01-04 NOTE — ANESTHESIA PREPROCEDURE EVALUATION
" Anesthesia Evaluation     Patient summary reviewed and Nursing notes reviewed   NPO Solid Status: > 8 hours  NPO Liquid Status: > 2 hours           Airway   Mallampati: II  TM distance: >3 FB  Neck ROM: full  No difficulty expected  Dental      Pulmonary    Cardiovascular         Neuro/Psych  (+) psychiatric history Anxiety  GI/Hepatic/Renal/Endo    (+) obesity    Musculoskeletal     Abdominal    Substance History      OB/GYN    (+) Pregnant        Other                    Anesthesia Plan    ASA 2     spinal     (I have reviewed the patient's history with the patient and the chart, including all pertinent laboratory results and imaging. I have explained the risks of spinal anesthesia including but not limited to hypotension, PDPH, nerve injury and risk of converting to GA. Patient understands risks and agrees to proceed.      /61 (BP Location: Left arm, Patient Position: Sitting)   Pulse 105   Temp 37.2 °C (98.9 °F) (Oral)   Resp 16   Ht 167.6 cm (66\")   Wt 110 kg (242 lb 9.6 oz)   LMP  (LMP Unknown)   Breastfeeding No   BMI 39.16 kg/m²   )    Anesthetic plan, risks, benefits, and alternatives have been provided, discussed and informed consent has been obtained with: patient.    CODE STATUS:    Level Of Support Discussed With: Patient  Code Status (Patient has no pulse and is not breathing): CPR (Attempt to Resuscitate)  Medical Interventions (Patient has pulse or is breathing): Full Support      "

## 2024-01-04 NOTE — PLAN OF CARE
Goal Outcome Evaluation:  Plan of Care Reviewed With: patient        Progress: improving  Outcome Evaluation: pt delivered a baby boy by repeat , , VSS, Bleeding and pain controlled at this time

## 2024-01-05 LAB
BASOPHILS # BLD AUTO: 0.03 10*3/MM3 (ref 0–0.2)
BASOPHILS NFR BLD AUTO: 0.3 % (ref 0–1.5)
DEPRECATED RDW RBC AUTO: 41.6 FL (ref 37–54)
EOSINOPHIL # BLD AUTO: 0.07 10*3/MM3 (ref 0–0.4)
EOSINOPHIL NFR BLD AUTO: 0.8 % (ref 0.3–6.2)
ERYTHROCYTE [DISTWIDTH] IN BLOOD BY AUTOMATED COUNT: 13.1 % (ref 12.3–15.4)
HCT VFR BLD AUTO: 33.8 % (ref 34–46.6)
HGB BLD-MCNC: 11.5 G/DL (ref 12–15.9)
IMM GRANULOCYTES # BLD AUTO: 0.03 10*3/MM3 (ref 0–0.05)
IMM GRANULOCYTES NFR BLD AUTO: 0.3 % (ref 0–0.5)
LYMPHOCYTES # BLD AUTO: 1.56 10*3/MM3 (ref 0.7–3.1)
LYMPHOCYTES NFR BLD AUTO: 17.8 % (ref 19.6–45.3)
MCH RBC QN AUTO: 29.8 PG (ref 26.6–33)
MCHC RBC AUTO-ENTMCNC: 34 G/DL (ref 31.5–35.7)
MCV RBC AUTO: 87.6 FL (ref 79–97)
MONOCYTES # BLD AUTO: 0.69 10*3/MM3 (ref 0.1–0.9)
MONOCYTES NFR BLD AUTO: 7.9 % (ref 5–12)
NEUTROPHILS NFR BLD AUTO: 6.37 10*3/MM3 (ref 1.7–7)
NEUTROPHILS NFR BLD AUTO: 72.9 % (ref 42.7–76)
NRBC BLD AUTO-RTO: 0 /100 WBC (ref 0–0.2)
PLATELET # BLD AUTO: 265 10*3/MM3 (ref 140–450)
PMV BLD AUTO: 10 FL (ref 6–12)
RBC # BLD AUTO: 3.86 10*6/MM3 (ref 3.77–5.28)
WBC NRBC COR # BLD AUTO: 8.75 10*3/MM3 (ref 3.4–10.8)

## 2024-01-05 PROCEDURE — 25010000002 KETOROLAC TROMETHAMINE PER 15 MG: Performed by: OBSTETRICS & GYNECOLOGY

## 2024-01-05 PROCEDURE — 25010000002 ENOXAPARIN PER 10 MG: Performed by: OBSTETRICS & GYNECOLOGY

## 2024-01-05 PROCEDURE — 85025 COMPLETE CBC W/AUTO DIFF WBC: CPT | Performed by: OBSTETRICS & GYNECOLOGY

## 2024-01-05 RX ADMIN — SERTRALINE 50 MG: 50 TABLET, FILM COATED ORAL at 21:45

## 2024-01-05 RX ADMIN — KETOROLAC TROMETHAMINE 15 MG: 15 INJECTION, SOLUTION INTRAMUSCULAR; INTRAVENOUS at 10:41

## 2024-01-05 RX ADMIN — DOCUSATE SODIUM 50MG AND SENNOSIDES 8.6MG 1 TABLET: 8.6; 5 TABLET, FILM COATED ORAL at 21:45

## 2024-01-05 RX ADMIN — ENOXAPARIN SODIUM 40 MG: 100 INJECTION SUBCUTANEOUS at 21:45

## 2024-01-05 RX ADMIN — ACETAMINOPHEN 1000 MG: 500 TABLET ORAL at 15:58

## 2024-01-05 RX ADMIN — ACETAMINOPHEN 1000 MG: 500 TABLET ORAL at 08:28

## 2024-01-05 RX ADMIN — ACETAMINOPHEN 650 MG: 325 TABLET, FILM COATED ORAL at 22:37

## 2024-01-05 RX ADMIN — KETOROLAC TROMETHAMINE 15 MG: 15 INJECTION, SOLUTION INTRAMUSCULAR; INTRAVENOUS at 16:48

## 2024-01-05 RX ADMIN — DOCUSATE SODIUM 50MG AND SENNOSIDES 8.6MG 1 TABLET: 8.6; 5 TABLET, FILM COATED ORAL at 08:28

## 2024-01-05 RX ADMIN — ACETAMINOPHEN 1000 MG: 500 TABLET ORAL at 01:59

## 2024-01-05 RX ADMIN — KETOROLAC TROMETHAMINE 15 MG: 15 INJECTION, SOLUTION INTRAMUSCULAR; INTRAVENOUS at 03:55

## 2024-01-05 NOTE — LACTATION NOTE
This note was copied from a baby's chart.  Informed PT, ARIAN is available again to help with BF until 2300. Mom reports baby is still BF well.  PT denieis any questions. Encouraged to call if needing BF help

## 2024-01-05 NOTE — PLAN OF CARE
Problem: Adult Inpatient Plan of Care  Goal: Plan of Care Review  Outcome: Ongoing, Progressing  Flowsheets (Taken 2024 0858)  Progress: improving  Plan of Care Reviewed With:   patient   spouse  Outcome Evaluation: VSS, assessments wnl, dtv @ 1000, working on breastfeeding, pain well controlled, ambulating well, dressing CDI.  Goal: Patient-Specific Goal (Individualized)  Outcome: Ongoing, Progressing  Goal: Absence of Hospital-Acquired Illness or Injury  Outcome: Ongoing, Progressing  Intervention: Identify and Manage Fall Risk  Recent Flowsheet Documentation  Taken 2024 by Jie Kinney RN  Safety Promotion/Fall Prevention: safety round/check completed  Intervention: Prevent Skin Injury  Recent Flowsheet Documentation  Taken 2024 by Jie Kinney RN  Body Position: position changed independently  Intervention: Prevent and Manage VTE (Venous Thromboembolism) Risk  Recent Flowsheet Documentation  Taken 2024 by Jie Kinney RN  Activity Management: up ad abbi  Goal: Optimal Comfort and Wellbeing  Outcome: Ongoing, Progressing  Intervention: Monitor Pain and Promote Comfort  Recent Flowsheet Documentation  Taken 2024 by Jie Kinney RN  Pain Management Interventions:   care clustered   quiet environment facilitated   see MAR  Intervention: Provide Person-Centered Care  Recent Flowsheet Documentation  Taken 2024 by Jie Kinney RN  Trust Relationship/Rapport:   care explained   choices provided  Goal: Readiness for Transition of Care  Outcome: Ongoing, Progressing     Problem: Skin Injury Risk Increased  Goal: Skin Health and Integrity  Outcome: Ongoing, Progressing  Intervention: Optimize Skin Protection  Recent Flowsheet Documentation  Taken 2024 by Jie Kinney RN  Head of Bed (HOB) Positioning: HOB at 30-45 degrees     Problem: Bleeding ( Delivery)  Goal: Bleeding is Controlled  Outcome: Ongoing, Progressing     Problem: Change in Fetal  Wellbeing ( Delivery)  Goal: Stable Fetal Wellbeing  Outcome: Ongoing, Progressing  Intervention: Promote and Monitor Fetal Wellbeing  Recent Flowsheet Documentation  Taken 2024 by Jie Kinney RN  Body Position: position changed independently     Problem: Infection ( Delivery)  Goal: Absence of Infection Signs and Symptoms  Outcome: Ongoing, Progressing     Problem: Respiratory Compromise ( Delivery)  Goal: Effective Oxygenation and Ventilation  Outcome: Ongoing, Progressing     Problem: Device-Related Complication Risk (Anesthesia/Analgesia, Neuraxial)  Goal: Safe Infusion Delivery Completion  Outcome: Ongoing, Progressing     Problem: Infection (Anesthesia/Analgesia, Neuraxial)  Goal: Absence of Infection Signs and Symptoms  Outcome: Ongoing, Progressing     Problem: Nausea and Vomiting (Anesthesia/Analgesia, Neuraxial)  Goal: Nausea and Vomiting Relief  Outcome: Ongoing, Progressing     Problem: Pain (Anesthesia/Analgesia, Neuraxial)  Goal: Effective Pain Control  Outcome: Ongoing, Progressing  Intervention: Prevent or Manage Pain  Recent Flowsheet Documentation  Taken 2024 by Jie Kinney RN  Pain Management Interventions:   care clustered   quiet environment facilitated   see MAR     Problem: Respiratory Compromise (Anesthesia/Analgesia, Neuraxial)  Goal: Effective Oxygenation and Ventilation  Outcome: Ongoing, Progressing  Intervention: Optimize Oxygenation and Ventilation  Recent Flowsheet Documentation  Taken 2024 by Jie Kinney RN  Head of Bed (HOB) Positioning: HOB at 30-45 degrees     Problem: Sensorimotor Impairment (Anesthesia/Analgesia, Neuraxial)  Goal: Baseline Motor Function  Outcome: Ongoing, Progressing  Intervention: Optimize Sensorimotor Function  Recent Flowsheet Documentation  Taken 2024 by Jie Kinney RN  Safety Promotion/Fall Prevention: safety round/check completed     Problem: Urinary Retention (Anesthesia/Analgesia,  Neuraxial)  Goal: Effective Urinary Elimination  Outcome: Ongoing, Progressing     Problem: Pain Acute  Goal: Acceptable Pain Control and Functional Ability  Outcome: Ongoing, Progressing  Intervention: Develop Pain Management Plan  Recent Flowsheet Documentation  Taken 1/5/2024 0828 by Jie Kinney RN  Pain Management Interventions:   care clustered   quiet environment facilitated   see MAR     Problem: Fall Injury Risk  Goal: Absence of Fall and Fall-Related Injury  Outcome: Ongoing, Progressing  Intervention: Promote Injury-Free Environment  Recent Flowsheet Documentation  Taken 1/5/2024 0828 by Jie Kinney RN  Safety Promotion/Fall Prevention: safety round/check completed   Goal Outcome Evaluation:  Plan of Care Reviewed With: patient, spouse        Progress: improving  Outcome Evaluation: VSS, assessments wnl, dtv @ 1000, working on breastfeeding, pain well controlled, ambulating well, dressing CDI.

## 2024-01-05 NOTE — PLAN OF CARE
Goal Outcome Evaluation:         Progressing well, pain controlled, due to void, has ambulated,breastfeeding

## 2024-01-05 NOTE — PROGRESS NOTES
[facility]   PROGRESS NOTE    Post-Op Day 1 S/P     Subjective   CC: post  section    Patient reports:  Her pain is well controlled with Motrin and Tylenol.  Her bleeding is normal.  No leg pain.  She is urinating and tolerating regular diet.  Dressing is still in place.     Objective      Vitals: Vital Signs Range for the last 24 hours  Temperature: Temp:  [97.6 °F (36.4 °C)-98.9 °F (37.2 °C)] 98 °F (36.7 °C)       BP: BP: ()/(49-74) 107/69   Pulse: Heart Rate:  [] 101   Respirations: Resp:  [16-20] 17                            Physical exam   General-  no acute distress, conversant    Lungs- respirations unlabored   CV- trace LE edema   Abdomen- soft, nontender, nondistended.  Fundus is firm.   Incision -dressing is dry   Lower extremities- nontender bilaterally    Results from last 7 days   Lab Units 24  0722   WBC 10*3/mm3 8.75   HEMOGLOBIN g/dL 11.5*   HEMATOCRIT % 33.8*   PLATELETS 10*3/mm3 265         Assessment & Plan        Previous  section    Pregnancy    Footling breech presentation    S/P  section      Assessment:    Marce TREVER Galvan is Day 1  post-op from      Patient is doing well  She desires circumcision for her son.  We discussed circumcision as an elective procedure with a small risk of bleeding, infection or injury to the penis.  Patient wishes to proceed.     Plan:  continue post op care, pain medication prn and encouraged ambulation.        Tyshawn Gill MD  2024  12:23 EST

## 2024-01-06 VITALS
HEIGHT: 66 IN | SYSTOLIC BLOOD PRESSURE: 111 MMHG | BODY MASS INDEX: 38.99 KG/M2 | TEMPERATURE: 98.3 F | RESPIRATION RATE: 16 BRPM | HEART RATE: 102 BPM | OXYGEN SATURATION: 98 % | WEIGHT: 242.6 LBS | DIASTOLIC BLOOD PRESSURE: 73 MMHG

## 2024-01-06 PROCEDURE — 0503F POSTPARTUM CARE VISIT: CPT | Performed by: OBSTETRICS & GYNECOLOGY

## 2024-01-06 PROCEDURE — 25010000002 ENOXAPARIN PER 10 MG: Performed by: OBSTETRICS & GYNECOLOGY

## 2024-01-06 RX ORDER — IBUPROFEN 600 MG/1
600 TABLET ORAL EVERY 6 HOURS PRN
Qty: 14 TABLET | Refills: 0 | Status: SHIPPED | OUTPATIENT
Start: 2024-01-06

## 2024-01-06 RX ADMIN — DOCUSATE SODIUM 50MG AND SENNOSIDES 8.6MG 1 TABLET: 8.6; 5 TABLET, FILM COATED ORAL at 08:27

## 2024-01-06 RX ADMIN — IBUPROFEN 600 MG: 600 TABLET, FILM COATED ORAL at 10:15

## 2024-01-06 RX ADMIN — ACETAMINOPHEN 650 MG: 325 TABLET, FILM COATED ORAL at 08:27

## 2024-01-06 RX ADMIN — IBUPROFEN 600 MG: 600 TABLET, FILM COATED ORAL at 03:18

## 2024-01-06 RX ADMIN — ENOXAPARIN SODIUM 40 MG: 100 INJECTION SUBCUTANEOUS at 08:27

## 2024-01-06 NOTE — NURSING NOTE
VSS. Patient was hesitant about supplementing with formula, but agreed to it around 0430. Baby responded positively to formula after cluster feeding. PO medications for pain control. Anticipating discharge today.

## 2024-01-06 NOTE — PROGRESS NOTES
"Continued Stay Note  [facility]     Patient Name: Marce Galvan  MRN: [id number]  Today's Date: 1/6/2024    Admit Date: 1/4/2024    Plan: Infant may discharge to mother when medically ready. ANGY Bynum   Discharge Plan       Row Name 01/06/24 1050       Plan    Plan Infant may discharge to mother when medically ready. ANGY Bynum    Plan Comments Mother: Marce Galvan, MRN: [id number]; infant: Tyler Galvan, MRN: [id number]. CSW was consulted for \"Parents are looking for information about [employer]\". CSW left a voicemail for the [employer] office to request they speak with patient about adding infant to Medicaid. CSW also provided information to parents about [employer], and how to apply in case the [employer] office is closed today. CSW also provided a packet of resources including: WIC, HANDS, transportation, infant supplies, counseling, online support groups, postpartum mood and anxiety resources, and general community resources. Parents denied having unmet needs at this time. CSW will be available for psychosocial needs for the duration of mother's hospital stay. ANGY Bynum                   Discharge Codes    No documentation.                       TIFFANIE Longo    "

## 2024-01-06 NOTE — DISCHARGE SUMMARY
OB Discharge Summary C/S    This  female, was admitted on 2024 and underwent a , Low Transverse  on 2024 , resulting in the birth of the following:  Infant         Weight:   Birth Information  YOB: 2026   Time of birth: 3:17 PM   Delivering clinician: Glenis Bautista   Sex: male   Delivery type: , Low Transverse   Breech type (if applicable):     Observed anomalies/comments: panda OR 1         Observations/Comments:  panda OR 1      Apgars: 8  @ 1 minute /    9  @ 5 minutes     LABS:   Lab Results (last 72 hours)       Procedure Component Value Units Date/Time    CBC & Differential [[id number]]  (Abnormal) Collected: 24    Specimen: Blood Updated: 24    Narrative:      The following orders were created for panel order CBC & Differential.  Procedure                               Abnormality         Status                     ---------                               -----------         ------                     CBC Auto Differential[[id number]]        Abnormal            Final result                 Please view results for these tests on the individual orders.    CBC Auto Differential [[id number]]  (Abnormal) Collected: 24    Specimen: Blood Updated: 24     WBC 8.75 10*3/mm3      RBC 3.86 10*6/mm3      Hemoglobin 11.5 g/dL      Hematocrit 33.8 %      MCV 87.6 fL      MCH 29.8 pg      MCHC 34.0 g/dL      RDW 13.1 %      RDW-SD 41.6 fl      MPV 10.0 fL      Platelets 265 10*3/mm3      Neutrophil % 72.9 %      Lymphocyte % 17.8 %      Monocyte % 7.9 %      Eosinophil % 0.8 %      Basophil % 0.3 %      Immature Grans % 0.3 %      Neutrophils, Absolute 6.37 10*3/mm3      Lymphocytes, Absolute 1.56 10*3/mm3      Monocytes, Absolute 0.69 10*3/mm3      Eosinophils, Absolute 0.07 10*3/mm3      Basophils, Absolute 0.03 10*3/mm3      Immature Grans, Absolute 0.03 10*3/mm3      nRBC 0.0 /100 WBC     RPR [[id number]]  (Normal) Collected:  01/04/24 1235    Specimen: Blood Updated: 01/04/24 1316     RPR Non-Reactive    Comprehensive Metabolic Panel [[id number]]  (Abnormal) Collected: 01/04/24 1235    Specimen: Blood Updated: 01/04/24 1308     Glucose 98 mg/dL      BUN 6 mg/dL      Creatinine 0.53 mg/dL      Sodium 136 mmol/L      Potassium 4.1 mmol/L      Chloride 107 mmol/L      CO2 18.0 mmol/L      Calcium 9.4 mg/dL      Total Protein 6.6 g/dL      Albumin 4.0 g/dL      ALT (SGPT) 28 U/L      AST (SGOT) 13 U/L      Alkaline Phosphatase 146 U/L      Total Bilirubin 0.3 mg/dL      Globulin 2.6 gm/dL      A/G Ratio 1.5 g/dL      BUN/Creatinine Ratio 11.3     Anion Gap 11.0 mmol/L      eGFR 136.0 mL/min/1.73     Narrative:      GFR Normal >60  Chronic Kidney Disease <60  Kidney Failure <15      CBC & Differential [[id number]]  (Abnormal) Collected: 01/04/24 1235    Specimen: Blood Updated: 01/04/24 1251    Narrative:      The following orders were created for panel order CBC & Differential.  Procedure                               Abnormality         Status                     ---------                               -----------         ------                     CBC Auto Differential[[id number]]        Abnormal            Final result                 Please view results for these tests on the individual orders.    CBC Auto Differential [[id number]]  (Abnormal) Collected: 01/04/24 1235    Specimen: Blood Updated: 01/04/24 1251     WBC 7.97 10*3/mm3      RBC 4.05 10*6/mm3      Hemoglobin 11.9 g/dL      Hematocrit 34.8 %      MCV 85.9 fL      MCH 29.4 pg      MCHC 34.2 g/dL      RDW 12.9 %      RDW-SD 40.4 fl      MPV 10.2 fL      Platelets 296 10*3/mm3      Neutrophil % 74.4 %      Lymphocyte % 18.2 %      Monocyte % 6.6 %      Eosinophil % 0.3 %      Basophil % 0.1 %      Immature Grans % 0.4 %      Neutrophils, Absolute 5.93 10*3/mm3      Lymphocytes, Absolute 1.45 10*3/mm3      Monocytes, Absolute 0.53 10*3/mm3      Eosinophils, Absolute 0.02 10*3/mm3       Basophils, Absolute 0.01 10*3/mm3      Immature Grans, Absolute 0.03 10*3/mm3      nRBC 0.0 /100 WBC             ROS:  Pulm: neg for soa  GI: neg for heavy bleeding              Musculoskel: neg for leg pain        OB History    Para Term  AB Living   2 2 2 0 0 2   SAB IAB Ectopic Molar Multiple Live Births   0 0 0 0 0 2      # Outcome Date GA Lbr Stevie/2nd Weight Sex Delivery Anes PTL Lv   2 Term 22 39w1d  3920 g (8 lb 10.3 oz) M CS-LTranv Spinal N MARQUIS      Birth Comments: panda OR 1   1 Term 20 39w6d  3650 g (8 lb 0.8 oz) M CS-LTranv EPI N MARQUIS      Birth Comments: Panda OR1      Complications: Failure to Progress in First Stage      Obstetric Comments   23 - IUP at 9-0 weeks - FRITZ 1/10/24 - [facility]    23 - 19-5 weeks - Normal but INCOMPLETE anatomy - Normal CL - [facility]    23 - 23-5 weeks - EFW 65%, AC 47% - Incomplete anatomy - F    10/20/23 - 28-2 weeks - EFW 36%, AC 54% - [facility]    23 - 32-2 weeks - EFW 63%, AC 78% - F    12/15/23 - 36-2 weeks - EFW 75%, AC 95% BPP  - [facility]         Discharge diagnosis:     Medical Problems       Hospital Problem List       Previous  section    Pregnancy    History of oligohydramnios    Footling breech presentation    S/P  section                                                        Previous  section [Z98.891]  Footling breech presentation, single or unspecified fetus [O32.8XX0]  Pregnancy [Z34.90]  S/P  section [Z98.891]                                                   section at 39w1d, uncomplicated recovery      Post operatively the patient did well. She was tolerating a regular diet, ambulating, voiding and passing flatus. Post op hemoglobin was   Lab Results   Component Value Date    HGB 11.5 (L) 2024   .   Patient declined another day of hospitalization and was requesting discharge.  She had been given approval by pediatrics    On day of discharge, uterus was firm, incision was clean,  dry and intact, extremities were non tender with no erythema or masses.     Pt was given prescriptions forMotrin PRN for pain.    Instructed to call the office to make an appointment in 2  and 6  Weeks and to    please call the office, or the OB/GYN on-call if after-hours, with any questions,concerns or  any of the followin) Fever - a temperature greater than 100.4  2) Increased pain  3) Heavy vaginal bleeding (soaking more than 1 pad in an hour)  4) Foul-smelling discharge from the vagina  5) Red, painful incision or foul-smelling discharge from incision.    She was instructed to  not place anything in the vagina -  Including  tampons, douches or having sex - until after her  6 week postpartum visit to prevent infection.    Wash your incision everyday with warm water and gentle soap. You may pat it dry.

## 2024-01-06 NOTE — PLAN OF CARE
Problem: Adult Inpatient Plan of Care  Goal: Absence of Hospital-Acquired Illness or Injury  Intervention: Identify and Manage Fall Risk  Recent Flowsheet Documentation  Taken 1/6/2024 1015 by Ling Phillips RN  Safety Promotion/Fall Prevention:   nonskid shoes/slippers when out of bed   safety round/check completed  Taken 1/6/2024 0905 by Ling Phillips RN  Safety Promotion/Fall Prevention:   nonskid shoes/slippers when out of bed   safety round/check completed  Taken 1/6/2024 0827 by Ling Phillips RN  Safety Promotion/Fall Prevention: safety round/check completed  Intervention: Prevent and Manage VTE (Venous Thromboembolism) Risk  Recent Flowsheet Documentation  Taken 1/6/2024 0827 by Ling Phillips RN  Activity Management: up ad abbi  Goal: Optimal Comfort and Wellbeing  Intervention: Monitor Pain and Promote Comfort  Recent Flowsheet Documentation  Taken 1/6/2024 1015 by Ling Phillips RN  Pain Management Interventions: see MAR  Taken 1/6/2024 0827 by Ling Phillips RN  Pain Management Interventions: see MAR  Intervention: Provide Person-Centered Care  Recent Flowsheet Documentation  Taken 1/6/2024 0827 by Ling Phillips RN  Trust Relationship/Rapport:   care explained   choices provided   questions answered   questions encouraged     Problem: Skin Injury Risk Increased  Goal: Skin Health and Integrity  Intervention: Optimize Skin Protection  Recent Flowsheet Documentation  Taken 1/6/2024 1015 by Ling Phillips RN  Head of Bed (HOB) Positioning: HOB elevated  Taken 1/6/2024 0905 by Ling Phillips RN  Head of Bed (HOB) Positioning: HOB elevated     Problem: Pain (Anesthesia/Analgesia, Neuraxial)  Goal: Effective Pain Control  Intervention: Prevent or Manage Pain  Recent Flowsheet Documentation  Taken 1/6/2024 1015 by Ling Phillips RN  Pain Management Interventions: see MAR  Taken 1/6/2024 0827 by Ling Phillips RN  Pain Management Interventions: see MAR     Problem: Respiratory Compromise  (Anesthesia/Analgesia, Neuraxial)  Goal: Effective Oxygenation and Ventilation  Intervention: Optimize Oxygenation and Ventilation  Recent Flowsheet Documentation  Taken 1/6/2024 1015 by Ling Phillips RN  Head of Bed (HOB) Positioning: HOB elevated  Taken 1/6/2024 0905 by Ling Phillips RN  Head of Bed (HOB) Positioning: HOB elevated     Problem: Sensorimotor Impairment (Anesthesia/Analgesia, Neuraxial)  Goal: Baseline Motor Function  Intervention: Optimize Sensorimotor Function  Recent Flowsheet Documentation  Taken 1/6/2024 1015 by Ling Phillips RN  Safety Promotion/Fall Prevention:   nonskid shoes/slippers when out of bed   safety round/check completed  Taken 1/6/2024 0905 by Ling Phillips RN  Safety Promotion/Fall Prevention:   nonskid shoes/slippers when out of bed   safety round/check completed  Taken 1/6/2024 0827 by Ling Phillips RN  Safety Promotion/Fall Prevention: safety round/check completed     Problem: Pain Acute  Goal: Acceptable Pain Control and Functional Ability  Intervention: Prevent or Manage Pain  Recent Flowsheet Documentation  Taken 1/6/2024 0827 by Ling Phillips RN  Medication Review/Management: medications reviewed  Intervention: Develop Pain Management Plan  Recent Flowsheet Documentation  Taken 1/6/2024 1015 by Ling Phillips RN  Pain Management Interventions: see MAR  Taken 1/6/2024 0827 by Ling Phillips RN  Pain Management Interventions: see MAR     Problem: Fall Injury Risk  Goal: Absence of Fall and Fall-Related Injury  Intervention: Identify and Manage Contributors  Recent Flowsheet Documentation  Taken 1/6/2024 0827 by Ling Phillips RN  Medication Review/Management: medications reviewed  Intervention: Promote Injury-Free Environment  Recent Flowsheet Documentation  Taken 1/6/2024 1015 by Ling Phillips RN  Safety Promotion/Fall Prevention:   nonskid shoes/slippers when out of bed   safety round/check completed  Taken 1/6/2024 0905 by Ling Phillips RN  Safety Promotion/Fall  Prevention:   nonskid shoes/slippers when out of bed   safety round/check completed  Taken 1/6/2024 0837 by Ling Phillips RN  Safety Promotion/Fall Prevention: safety round/check completed   Goal Outcome Evaluation:

## 2024-01-15 ENCOUNTER — POSTPARTUM VISIT (OUTPATIENT)
Dept: OBSTETRICS AND GYNECOLOGY | Age: 19
End: 2024-01-15
Payer: COMMERCIAL

## 2024-01-15 ENCOUNTER — MATERNAL SCREENING (OUTPATIENT)
Dept: CALL CENTER | Facility: HOSPITAL | Age: 19
End: 2024-01-15
Payer: COMMERCIAL

## 2024-01-15 VITALS
DIASTOLIC BLOOD PRESSURE: 70 MMHG | WEIGHT: 218 LBS | BODY MASS INDEX: 35.03 KG/M2 | SYSTOLIC BLOOD PRESSURE: 102 MMHG | HEIGHT: 66 IN

## 2024-01-15 DIAGNOSIS — Z98.891 S/P CESAREAN SECTION: ICD-10-CM

## 2024-01-15 PROBLEM — Z87.59 HISTORY OF OLIGOHYDRAMNIOS: Status: RESOLVED | Noted: 2023-06-23 | Resolved: 2024-01-15

## 2024-01-15 PROBLEM — U07.1 COVID-19 AFFECTING PREGNANCY IN SECOND TRIMESTER: Status: RESOLVED | Noted: 2023-07-25 | Resolved: 2024-01-15

## 2024-01-15 PROBLEM — Z34.90 PREGNANCY: Status: RESOLVED | Noted: 2023-06-23 | Resolved: 2024-01-15

## 2024-01-15 PROBLEM — O98.512 COVID-19 AFFECTING PREGNANCY IN SECOND TRIMESTER: Status: RESOLVED | Noted: 2023-07-25 | Resolved: 2024-01-15

## 2024-01-15 PROBLEM — O32.8XX0 FOOTLING BREECH PRESENTATION: Status: RESOLVED | Noted: 2024-01-03 | Resolved: 2024-01-15

## 2024-01-15 PROBLEM — Z87.59 HISTORY OF PLACENTAL ABRUPTION: Status: RESOLVED | Noted: 2023-08-21 | Resolved: 2024-01-15

## 2024-01-15 PROCEDURE — 0503F POSTPARTUM CARE VISIT: CPT | Performed by: OBSTETRICS & GYNECOLOGY

## 2024-01-15 NOTE — PROGRESS NOTES
"Subjective   Marce Galvan is a 18 y.o. female who presents for a postpartum visit. She is  11 days   days postpartum following a low cervical transverse  section. I have fully reviewed the prenatal and intrapartum course. The delivery was at 39-1 gestational weeks. Outcome: repeat  section, low transverse incision. Anesthesia: spinal. Postpartum course has been uncomplicated. Baby's course has been uncomplicated. Baby is feeding by breast. Bleeding thin lochia. Bowel function is normal. Bladder function is normal. Patient is not sexually active. Contraception method is condoms and rhythm method. Postpartum depression screening: negative.    The following portions of the patient's history were reviewed and updated as appropriate: allergies, current medications, past family history, past medical history, past social history, past surgical history, and problem list.    Review of Systems  Pertinent items are noted in HPI.    Objective   /70   Ht 167.6 cm (66\")   Wt 98.9 kg (218 lb)   LMP  (LMP Unknown)   Breastfeeding Yes   BMI 35.19 kg/m²    General:  alert, appears stated age, and cooperative   Abdomen: soft, non-tender; bowel sounds normal; no masses,  no organomegaly and Inc C/D/I      Assessment & Plan   postpartum exam. Pap smear not done at today's visit.    1. Contraception: condoms and rhythm method  2. Follow up in: 4 weeks or as needed.           "

## 2024-01-15 NOTE — OUTREACH NOTE
Maternal Screening Survey      Flowsheet Row Responses   Facility patient discharged from? [facility]   Attempt successful? No   Unsuccessful attempts Attempt 2              Zaheer FAJARDO - Registered Nurse

## 2024-01-15 NOTE — OUTREACH NOTE
Maternal Screening Survey      Flowsheet Row Responses   Facility patient discharged from? [facility]   Attempt successful? No   Unsuccessful attempts Attempt 1              Zaheer FAJARDO - Registered Nurse

## 2024-01-16 ENCOUNTER — MATERNAL SCREENING (OUTPATIENT)
Dept: CALL CENTER | Facility: HOSPITAL | Age: 19
End: 2024-01-16
Payer: COMMERCIAL

## 2024-01-16 NOTE — OUTREACH NOTE
Maternal Screening Survey      Flowsheet Row Responses   [facility]   [facility]? Yes   Call start time    Call end time    EPD Scale: Able to Laugh 0-->as much as she always could   EPD Scale: Looked Forward 0-->as much as she ever did   EPD Scale: Blamed Self 1-->not very often   EPD Scale: Been Anxious 1-->hardly ever   EPD Scale: Felt Panicky 1-->no, not much   EPD Scale: Things Getting on Top 0-->no, has been coping as well as ever   EPD Scale: Difficulty Sleeping 0-->no, not at all   EPD Scale: Sad or Miserable 0-->no, not at all   EPD Scale: Crying 1-->only occasionally   EPD Scale: Thought of Harming Self 0-->never   [place]  Depression Score 4   Did any of your parents have problems with alcohol or drug use? No   Do any of your peers have problems with alcohol or drug use? No   Does your partner have problems with alcohol or drug use? No   Before you were pregnant did you have problems with alcohol or drug use? (past) No   In the past month, did you drink beer, wine, liquor or use any other drugs? (pregnancy) No   Maternal Screening call completed Yes   Wrap up additional comments pt seems to be doing well. no concerns and knows she can contact her OB if anything changes   Call end time               SKINNY KENDRICK - Registered Nurse

## 2024-02-23 ENCOUNTER — POSTPARTUM VISIT (OUTPATIENT)
Dept: OBSTETRICS AND GYNECOLOGY | Age: 19
End: 2024-02-23
Payer: COMMERCIAL

## 2024-02-23 VITALS
SYSTOLIC BLOOD PRESSURE: 104 MMHG | DIASTOLIC BLOOD PRESSURE: 68 MMHG | WEIGHT: 222 LBS | BODY MASS INDEX: 35.68 KG/M2 | HEIGHT: 66 IN

## 2024-02-23 DIAGNOSIS — Z98.891 S/P CESAREAN SECTION: ICD-10-CM

## 2024-02-23 NOTE — PROGRESS NOTES
"Subjective   Marce Galvan is a 18 y.o. female who presents for a postpartum visit. She is 7 weeks postpartum following a low cervical transverse  section. I have fully reviewed the prenatal and intrapartum course. The delivery was at 39-1 gestational weeks. Outcome: repeat  section, low transverse incision. Anesthesia: spinal. Postpartum course has been uncomplicated. Baby's course has been uncomplicated . Baby is feeding by breast. Bleeding no bleeding. Bowel function is normal. Bladder function is normal. Patient is not sexually active. Contraception method is condoms and rhythm method. Postpartum depression screening: negative.    The following portions of the patient's history were reviewed and updated as appropriate: allergies, current medications, past family history, past medical history, past social history, past surgical history, and problem list.    Review of Systems  Pertinent items are noted in HPI.    Objective   /68   Ht 167.6 cm (66\")   Wt 101 kg (222 lb)   LMP  (LMP Unknown)   Breastfeeding Yes   BMI 35.83 kg/m²    General:  alert, appears stated age, and cooperative   Abdomen: soft, non-tender; bowel sounds normal; no masses,  no organomegaly and Inc healed well       Assessment & Plan   postpartum exam. Pap smear not done at today's visit.    1. Contraception: condoms and rhythm method  2. Follow up in: 8 months or as needed.           "

## 2024-08-19 NOTE — PROGRESS NOTES
Chief Complaint   Patient presents with    Routine Prenatal Visit     36 weeks  CC:  no problems       HPI: 18 y.o.  at 36w2d     Doing well  Reports good FM  Denies LOF, bleeding or ctx's  Pt of Dr. Bautista     Vitals:    12/15/23 1027   BP: 120/68   Weight: 109 kg (241 lb)       ROS:  GI:  Negative  : Negative  Pulmonary: Negative     A/P  1. Intrauterine pregnancy at 36w2d     Diagnoses and all orders for this visit:    1. Prenatal care, subsequent pregnancy in third trimester (Primary)    2. 36 weeks gestation of pregnancy  -     POC Urinalysis Dipstick    3. Previous  section    4. History of oligohydramnios    5. History of placental abruption    6. Desires  (vaginal birth after ) trial    7. COVID-19 affecting pregnancy in second trimester    8. Encounter for  screening for Streptococcus B  -     Strep B Screen - Swab, Vaginal/Rectum        -----------------------  PLAN:   Normal overall growth, large AC  BPP   GBS done  RSV vaccine given  PTL/FKC discussed  Add weekly BPP's to ob checks  Return for Next scheduled follow up.      Triny Melton, APRRAJAT  12/15/2023 10:53 EST   Negative

## 2024-09-23 ENCOUNTER — OFFICE VISIT (OUTPATIENT)
Dept: OBSTETRICS AND GYNECOLOGY | Age: 19
End: 2024-09-23
Payer: COMMERCIAL

## 2024-09-23 VITALS
WEIGHT: 246 LBS | DIASTOLIC BLOOD PRESSURE: 70 MMHG | HEIGHT: 66 IN | BODY MASS INDEX: 39.53 KG/M2 | SYSTOLIC BLOOD PRESSURE: 132 MMHG

## 2024-09-23 DIAGNOSIS — Z32.00 POSSIBLE PREGNANCY, NOT CONFIRMED: ICD-10-CM

## 2024-09-23 DIAGNOSIS — Z30.09 ENCOUNTER FOR OTHER GENERAL COUNSELING OR ADVICE ON CONTRACEPTION: ICD-10-CM

## 2024-09-23 DIAGNOSIS — Z01.419 ENCOUNTER FOR GYNECOLOGICAL EXAMINATION WITHOUT ABNORMAL FINDING: Primary | ICD-10-CM

## 2024-09-23 PROBLEM — Z98.891 S/P CESAREAN SECTION: Status: RESOLVED | Noted: 2024-01-04 | Resolved: 2024-09-23

## 2024-09-23 LAB
B-HCG UR QL: NEGATIVE
EXPIRATION DATE: NORMAL
INTERNAL NEGATIVE CONTROL: NEGATIVE
INTERNAL POSITIVE CONTROL: POSITIVE
Lab: NORMAL

## 2024-09-23 PROCEDURE — 81025 URINE PREGNANCY TEST: CPT | Performed by: OBSTETRICS & GYNECOLOGY

## 2024-09-23 PROCEDURE — 99395 PREV VISIT EST AGE 18-39: CPT | Performed by: OBSTETRICS & GYNECOLOGY

## 2024-09-30 LAB
CONV .: NORMAL
CYTOLOGIST CVX/VAG CYTO: NORMAL
CYTOLOGY CVX/VAG DOC CYTO: NORMAL
CYTOLOGY CVX/VAG DOC THIN PREP: NORMAL
DX ICD CODE: NORMAL
Lab: NORMAL
OTHER STN SPEC: NORMAL
STAT OF ADQ CVX/VAG CYTO-IMP: NORMAL

## 2024-11-05 ENCOUNTER — TELEPHONE (OUTPATIENT)
Dept: OBSTETRICS AND GYNECOLOGY | Age: 19
End: 2024-11-05
Payer: COMMERCIAL

## 2024-11-05 ENCOUNTER — PATIENT MESSAGE (OUTPATIENT)
Dept: OBSTETRICS AND GYNECOLOGY | Age: 19
End: 2024-11-05
Payer: COMMERCIAL

## 2024-11-05 NOTE — TELEPHONE ENCOUNTER
I was one week shy of being three months done with breast feeding when I finally got a period. At first I thought that meant I had nothing to worry about but now I’m on day 10 of bleeding with no signs of it stopping. The first couple days were just spotting and it slowly got heavier every day. The past few days have been the heaviest days but it’s not more blood than I would have during a normal period. There’s been hardly any cramping and no large blood clots. Normally my periods only last 5-6 day so I’m wondering if I should be worried .     , I told patient that the first period is usually on the heavier side but I will check with you .

## 2024-11-05 NOTE — TELEPHONE ENCOUNTER
Yes, first period after delivery is and usually much heavier.  If she would like, a birth control pill could regulate and lighten the bleeding?  Or you can make an appointment for an ultrasound and follow-up with nurse practitioner this week.

## 2024-11-07 ENCOUNTER — OFFICE VISIT (OUTPATIENT)
Dept: OBSTETRICS AND GYNECOLOGY | Age: 19
End: 2024-11-07
Payer: COMMERCIAL

## 2024-11-07 VITALS
SYSTOLIC BLOOD PRESSURE: 106 MMHG | HEIGHT: 66 IN | BODY MASS INDEX: 40.36 KG/M2 | DIASTOLIC BLOOD PRESSURE: 68 MMHG | WEIGHT: 251.1 LBS

## 2024-11-07 DIAGNOSIS — R93.89 THICKENED ENDOMETRIUM: ICD-10-CM

## 2024-11-07 DIAGNOSIS — N93.9 ABNORMAL UTERINE BLEEDING (AUB): Primary | ICD-10-CM

## 2024-11-07 RX ORDER — MEDROXYPROGESTERONE ACETATE 10 MG
10 TABLET ORAL DAILY
Qty: 10 TABLET | Refills: 0 | Status: SHIPPED | OUTPATIENT
Start: 2024-11-07 | End: 2024-11-17

## 2024-11-07 NOTE — PROGRESS NOTES
"Subjective   Marce TREVER Galvan is a 19 y.o. female is being seen today for   Chief Complaint   Patient presents with    Menstrual Problem     gyn f/u & US - irregular menses    CC: pt said this is her first PP cycle been on for 12 days    .    History of Present Illness    Patient of Dr Bautista  Here for prolonged menses this month  States she recently stopped breastfeeding- has not had a period since the delivery of her son 10 months ago  Has been bleeding for 12 days  Minimal cramping  Bleeding has been variable in heaviness, one night it was very heavy and she had to change a pad after just one hour  Bleeding is very light currently  She is not on BC and doesn't want any- uses NFP and plans to have another baby next year  No other concerns- no new partners or concerns for STDs    The following portions of the patient's history were reviewed and updated as appropriate: allergies, current medications, past family history, past medical history, past social history, past surgical history and problem list.    /68   Ht 167.6 cm (65.98\")   Wt 114 kg (251 lb 1.6 oz)   LMP 10/27/2024   Breastfeeding No   BMI 40.55 kg/m²         Review of Systems   Constitutional: Negative.    HENT: Negative.     Eyes: Negative.    Respiratory: Negative.     Cardiovascular: Negative.    Gastrointestinal: Negative.    Endocrine: Negative.    Genitourinary:  Positive for vaginal bleeding.   Musculoskeletal: Negative.    Skin: Negative.    Allergic/Immunologic: Negative.    Neurological: Negative.    Hematological: Negative.    Psychiatric/Behavioral: Negative.         Objective   Physical Exam  Constitutional:       Appearance: She is well-developed.   Cardiovascular:      Rate and Rhythm: Normal rate and regular rhythm.   Pulmonary:      Effort: Pulmonary effort is normal.   Abdominal:      General: Bowel sounds are normal. There is no distension.      Palpations: Abdomen is soft.      Tenderness: There is no abdominal tenderness. "   Skin:     General: Skin is warm and dry.   Neurological:      Mental Status: She is alert and oriented to person, place, and time.   Psychiatric:         Behavior: Behavior normal.           Assessment & Plan   Diagnoses and all orders for this visit:    1. Abnormal uterine bleeding (AUB) (Primary)  -     CBC (No Diff)    2. Thickened endometrium    Other orders  -     medroxyPROGESTERone (Provera) 10 MG tablet; Take 1 tablet by mouth Daily for 10 days.  Dispense: 10 tablet; Refill: 0        Ultrasound done- anteverted uterus, thickened endometrium- 27mm  Normal right ovary  Left ovary with 4.9cm simple cyst  Plan provera x10 days, discussed with patient that after completion of that she should have another cycle and hopefully shed the rest of the lining  Follow up in 4 weeks with ultrasound to re evaluate endometrial thickness and ovarian cyst  Bleeding precautions reviewed         Total time spent today with Marce  was 30 minutes (level 4).  Greater than 50% of the time was spent coordinating care, answering her questions and counseling regarding pathophysiology of her presenting problem along with plans for any diagnositc work-up and treatment.

## 2024-11-08 LAB
ERYTHROCYTE [DISTWIDTH] IN BLOOD BY AUTOMATED COUNT: 12.6 % (ref 12.3–15.4)
HCT VFR BLD AUTO: 41.9 % (ref 34–46.6)
HGB BLD-MCNC: 14.2 G/DL (ref 12–15.9)
MCH RBC QN AUTO: 29.4 PG (ref 26.6–33)
MCHC RBC AUTO-ENTMCNC: 33.9 G/DL (ref 31.5–35.7)
MCV RBC AUTO: 86.7 FL (ref 79–97)
PLATELET # BLD AUTO: 361 10*3/MM3 (ref 140–450)
RBC # BLD AUTO: 4.83 10*6/MM3 (ref 3.77–5.28)
WBC # BLD AUTO: 8.83 10*3/MM3 (ref 3.4–10.8)

## 2024-11-14 RX ORDER — MEDROXYPROGESTERONE ACETATE 10 MG
20 TABLET ORAL 2 TIMES DAILY
Qty: 28 TABLET | Refills: 0 | Status: SHIPPED | OUTPATIENT
Start: 2024-11-14 | End: 2024-11-21

## 2024-12-09 ENCOUNTER — OFFICE VISIT (OUTPATIENT)
Dept: OBSTETRICS AND GYNECOLOGY | Age: 19
End: 2024-12-09
Payer: COMMERCIAL

## 2024-12-09 VITALS
BODY MASS INDEX: 40.66 KG/M2 | DIASTOLIC BLOOD PRESSURE: 76 MMHG | SYSTOLIC BLOOD PRESSURE: 110 MMHG | WEIGHT: 253 LBS | HEIGHT: 66 IN

## 2024-12-09 DIAGNOSIS — N93.9 ABNORMAL UTERINE BLEEDING (AUB): ICD-10-CM

## 2024-12-09 DIAGNOSIS — N83.202 CYST OF LEFT OVARY: Primary | ICD-10-CM

## 2024-12-09 DIAGNOSIS — R93.89 THICKENED ENDOMETRIUM: ICD-10-CM

## 2024-12-09 PROCEDURE — 99213 OFFICE O/P EST LOW 20 MIN: CPT | Performed by: OBSTETRICS & GYNECOLOGY

## 2024-12-09 NOTE — PROGRESS NOTES
"Subjective   Marce Galvan is a 19 y.o. female presents for  4 wk follow up with ultrasound  from 11/7  to re evaluate endometrial thickness and ovarian cyst  Last period 10/27/24 till 11/22/24 , contraception condoms and rhythm method.  Ultrasound today does show decreased endometrial thickness from 27 to 15 mm.  There is a left 6 cm simple ovarian cyst.      History of Present Illness    The following portions of the patient's history were reviewed and updated as appropriate: allergies, current medications, past family history, past medical history, past social history, past surgical history, and problem list.    Review of Systems   Constitutional:  Negative for chills, fatigue and fever.   Genitourinary:  Positive for menstrual problem. Negative for dyspareunia, dysuria, pelvic pain, vaginal bleeding, vaginal discharge and vaginal pain.   All other systems reviewed and are negative.  /76   Ht 167.6 cm (66\")   Wt 115 kg (253 lb)   LMP 10/27/2024 (Exact Date)   Breastfeeding No   BMI 40.84 kg/m²       Objective   Physical Exam  Vitals and nursing note reviewed.   Constitutional:       Appearance: Normal appearance. She is obese.   Pulmonary:      Effort: Pulmonary effort is normal.   Neurological:      Mental Status: She is alert and oriented to person, place, and time.   Psychiatric:         Mood and Affect: Mood normal.         Behavior: Behavior normal.         Assessment & Plan   Diagnoses and all orders for this visit:    1. Cyst of left ovary (Primary)    2. Abnormal uterine bleeding (AUB)  -     CBC (No Diff)  -     TSH    3. Thickened endometrium         Counseling was given to patient for the following topics: diagnostic results, instructions for management, risk factor reductions, risks and benefits of treatment options, and importance of treatment compliance . Total time of the encounter was 20 minutes and 15 minutes was spent counseling, exclusive of ultrasound results.           "

## 2024-12-10 LAB
ERYTHROCYTE [DISTWIDTH] IN BLOOD BY AUTOMATED COUNT: 13.2 % (ref 11.7–15.4)
HCT VFR BLD AUTO: 42.2 % (ref 34–46.6)
HGB BLD-MCNC: 13.6 G/DL (ref 11.1–15.9)
MCH RBC QN AUTO: 29.1 PG (ref 26.6–33)
MCHC RBC AUTO-ENTMCNC: 32.2 G/DL (ref 31.5–35.7)
MCV RBC AUTO: 90 FL (ref 79–97)
PLATELET # BLD AUTO: 366 X10E3/UL (ref 150–450)
RBC # BLD AUTO: 4.68 X10E6/UL (ref 3.77–5.28)
TSH SERPL DL<=0.005 MIU/L-ACNC: 1.5 UIU/ML (ref 0.45–4.5)
WBC # BLD AUTO: 8.9 X10E3/UL (ref 3.4–10.8)

## 2024-12-17 ENCOUNTER — PATIENT MESSAGE (OUTPATIENT)
Dept: OBSTETRICS AND GYNECOLOGY | Age: 19
End: 2024-12-17
Payer: COMMERCIAL

## 2024-12-17 ENCOUNTER — TELEPHONE (OUTPATIENT)
Dept: OBSTETRICS AND GYNECOLOGY | Age: 19
End: 2024-12-17
Payer: COMMERCIAL

## 2024-12-18 ENCOUNTER — TELEPHONE (OUTPATIENT)
Dept: OBSTETRICS AND GYNECOLOGY | Age: 19
End: 2024-12-18
Payer: COMMERCIAL

## 2024-12-18 DIAGNOSIS — N93.9 ABNORMAL UTERINE BLEEDING (AUB): Primary | ICD-10-CM

## 2024-12-23 LAB
17OHP SERPL-MCNC: 20 NG/DL
DHEA-S SERPL-MCNC: 361 UG/DL (ref 110–431.7)
ESTRADIOL SERPL-MCNC: 20.9 PG/ML
FSH SERPL-ACNC: 4.1 MIU/ML
LH SERPL-ACNC: 2.4 MIU/ML
PROGEST SERPL-MCNC: 0.1 NG/ML
SPECIMEN STATUS: NORMAL
TESTOST FREE SERPL-MCNC: 3.7 PG/ML (ref 0–4.2)

## 2025-01-14 ENCOUNTER — TELEPHONE (OUTPATIENT)
Dept: OBSTETRICS AND GYNECOLOGY | Age: 20
End: 2025-01-14
Payer: COMMERCIAL

## 2025-03-10 ENCOUNTER — OFFICE VISIT (OUTPATIENT)
Dept: OBSTETRICS AND GYNECOLOGY | Age: 20
End: 2025-03-10
Payer: COMMERCIAL

## 2025-03-10 VITALS
WEIGHT: 253 LBS | HEIGHT: 66 IN | BODY MASS INDEX: 40.66 KG/M2 | DIASTOLIC BLOOD PRESSURE: 68 MMHG | SYSTOLIC BLOOD PRESSURE: 110 MMHG

## 2025-03-10 DIAGNOSIS — R93.89 THICKENED ENDOMETRIUM: Primary | ICD-10-CM

## 2025-03-10 DIAGNOSIS — N93.9 ABNORMAL UTERINE BLEEDING (AUB): ICD-10-CM

## 2025-03-10 DIAGNOSIS — N83.202 CYST OF LEFT OVARY: ICD-10-CM

## 2025-03-10 RX ORDER — PRENATAL VIT NO.126/IRON/FOLIC 28MG-0.8MG
TABLET ORAL DAILY
COMMUNITY

## 2025-03-10 NOTE — PROGRESS NOTES
"Subjective   Marce Galvan is a 20 y.o. female. Presents for follow up with ultrasound  to re evaluate endometrial thickness and ovarian cyst , last LMP 2/15/25 , sonographer says she might be early pregnancy , urine HCG negative today.  Ultrasound today reveals a slightly enlarged anteverted uterus with an estimated volume of 103 cm and a fluid structure in the endometrium measuring 5 mm and a stable left 6 centimeter ovarian simple cyst.      Gynecologic Exam  The patient's pertinent negatives include no vaginal discharge. Pertinent negatives include no abdominal pain, chills, dysuria or fever.       The following portions of the patient's history were reviewed and updated as appropriate: allergies, current medications, past family history, past medical history, past social history, past surgical history, and problem list.    Review of Systems   Constitutional:  Negative for chills, fatigue and fever.   Gastrointestinal:  Negative for abdominal pain.   Genitourinary:  Negative for dysuria, vaginal bleeding, vaginal discharge and vaginal pain.   All other systems reviewed and are negative.    /68   Ht 167.6 cm (66\")   Wt 115 kg (253 lb)   LMP 02/15/2025 (Exact Date)   BMI 40.84 kg/m²     Objective   Physical Exam  Vitals and nursing note reviewed.   Constitutional:       Appearance: Normal appearance. She is obese.   Pulmonary:      Effort: Pulmonary effort is normal.   Neurological:      Mental Status: She is alert and oriented to person, place, and time.   Psychiatric:         Mood and Affect: Mood normal.         Behavior: Behavior normal.         Assessment & Plan   Diagnoses and all orders for this visit:    1. Thickened endometrium (Primary)    2. Abnormal uterine bleeding (AUB)    3. Cyst of left ovary  -     HCG, B-subunit, Quantitative ([facility] Only)  -            Counseling was given to patient for the following topics: diagnostic results, instructions for management, prognosis, " impressions, and importance of treatment compliance . Total time of the encounter was 20 minutes and 15 minutes was spent counseling exclusive of ultrasound results.    Return in about 29 weeks (around 9/29/2025) for Annual physical and TVUS .

## 2025-03-11 LAB
CANCER AG125 SERPL-ACNC: 13.1 U/ML (ref 0–38.1)
HCG INTACT+B SERPL-ACNC: <1 MIU/ML

## 2025-05-22 ENCOUNTER — TELEPHONE (OUTPATIENT)
Dept: OBSTETRICS AND GYNECOLOGY | Age: 20
End: 2025-05-22

## 2025-05-22 NOTE — TELEPHONE ENCOUNTER
"    Caller: Marce Galvan \"Marce\"  Female, 20 y.o., 2005  MRN: [id number]  CSN: [id number]  Phone: [phone]    Relationship to patient: SELF          Type of visit: NEW OB/ US            Additional notes:[facility] PT FIRST [facility] ON  6-25-25  LMP-4-4-25  PLEASE ADVISE IF PT ABLE TO BE SEEN SOONER        "

## 2025-05-23 NOTE — TELEPHONE ENCOUNTER
Lvmtcb. Sooner appts available with NP or PA if pt desires. Per LMP pt is currently around 7 week gestation.

## 2025-05-28 ENCOUNTER — TELEPHONE (OUTPATIENT)
Dept: OBSTETRICS AND GYNECOLOGY | Age: 20
End: 2025-05-28
Payer: COMMERCIAL

## 2025-05-28 ENCOUNTER — OFFICE VISIT (OUTPATIENT)
Dept: OBSTETRICS AND GYNECOLOGY | Age: 20
End: 2025-05-28
Payer: COMMERCIAL

## 2025-05-28 VITALS
WEIGHT: 252 LBS | SYSTOLIC BLOOD PRESSURE: 120 MMHG | HEIGHT: 66 IN | DIASTOLIC BLOOD PRESSURE: 74 MMHG | BODY MASS INDEX: 40.5 KG/M2

## 2025-05-28 DIAGNOSIS — Z34.90 EARLY STAGE OF PREGNANCY: Primary | ICD-10-CM

## 2025-05-28 DIAGNOSIS — N83.202 CYST OF LEFT OVARY: ICD-10-CM

## 2025-05-28 RX ORDER — CHOLECALCIFEROL (VITAMIN D3) 25 MCG
1000 TABLET ORAL DAILY
COMMUNITY

## 2025-05-28 NOTE — TELEPHONE ENCOUNTER
Pt calling with LMP of 4/4/25. Pt stating during the night she woke up with heavy vaginal bleeding with abdominal cramping. Pt stating bleeding has lessened since but still having spotting. Pt added today with PA & US for evaluation.

## 2025-05-28 NOTE — PROGRESS NOTES
"Subjective     Chief Complaint   Patient presents with    Possible Pregnancy     Early ob c/o heavy gush of vaginal bleeding in the middle of the night.  She is still having some light red bleeding today, cramping is mild.  LMP 2025.  Ultrasound done today.       Marce Galvan is a 20 y.o.  whose LMP is Patient's last menstrual period was 2025 (exact date). presents to establish care for early pregnancy    Marce is here with her     LMP in February was the   She noted ovulation two week later then expected  Period came on the   +pregnancy test was positive on the 14th May and was previously negative    She had a \"gush\" of blood in the middle of the night  Not prompted by IC   She has light bleeding today  Mild cramping      No Additional Complaints Reported    The following portions of the patient's history were reviewed and updated as appropriate:no additional history reviewed, vital signs, allergies, current medications, past medical history, past social history, past surgical history, and problem list      Review of Systems   Genitourinary:positive for early stage of pregnancy     Objective      /74   Ht 167.6 cm (66\")   Wt 114 kg (252 lb)   LMP 2025 (Exact Date)   BMI 40.67 kg/m²     Physical Exam    General:   alert, comfortable, and no distress   Heart: Not performed today   Lungs: Not performed today.   Breast: Not performed today   Neck: Not performed today   Abdomen: Not performed today   CVA: Not performed today   Pelvis: Not performed today   Extremities: Not performed today   Neurologic: negative   Psychiatric: Normal affect, judgement, and mood       Lab Review   Labs: No data reviewed    Imaging   Ultrasound - Pelvic Vaginal    Impression: ** Preliminary Reading ** This report has not been finalized by a physician   Intrauterine pregnancy at 6 wks 1 day  Intrauterine gestational sac with + yolk sac, no fetal pole visible   Basis for EDC: " Ultrasound   Large ovarian cyst seen on left ovary measuring 8 cm  FRITZ based on LMP 1/9/2025  FRITZ based on u/s 120/2025    Relevant comparison data: Comparison is made with study dated: 3/10/2025      Relevant comparison data: Comparison is made with study dated: 3/10/2025     Assessment & Plan     ASSESSMENT  1. Early stage of pregnancy    2. Cyst of left ovary          PLAN  1. U/s dates are off but suspect this is r/t late ovulation. Plan rpt scan in 1 wk for further reassurance. Unclear what prompted bleeding, pelvic rest advised. Disc cyst on left ovary, previously measured 6 cm. Would recommend pelvic rest b/c of this cyst as well and disc torsion and rupture warnings.     Follow up: 1 week(s)    RADHA Rayo  5/28/2025

## 2025-06-02 DIAGNOSIS — Z34.90 EARLY STAGE OF PREGNANCY: Primary | ICD-10-CM

## 2025-06-03 LAB — HCG INTACT+B SERPL-ACNC: NORMAL MIU/ML

## 2025-06-06 ENCOUNTER — OFFICE VISIT (OUTPATIENT)
Dept: OBSTETRICS AND GYNECOLOGY | Age: 20
End: 2025-06-06
Payer: COMMERCIAL

## 2025-06-06 VITALS
HEIGHT: 66 IN | SYSTOLIC BLOOD PRESSURE: 118 MMHG | DIASTOLIC BLOOD PRESSURE: 72 MMHG | WEIGHT: 253 LBS | BODY MASS INDEX: 40.66 KG/M2

## 2025-06-06 DIAGNOSIS — O20.9 VAGINAL BLEEDING AFFECTING EARLY PREGNANCY: ICD-10-CM

## 2025-06-06 DIAGNOSIS — O21.9 NAUSEA/VOMITING IN PREGNANCY: ICD-10-CM

## 2025-06-06 DIAGNOSIS — Z32.00 POSSIBLE PREGNANCY, NOT CONFIRMED: ICD-10-CM

## 2025-06-06 DIAGNOSIS — Z32.00 VISIT FOR CONFIRMATION OF PREGNANCY TEST RESULT WITH PHYSICAL EXAM: Primary | ICD-10-CM

## 2025-06-06 PROBLEM — N93.9 ABNORMAL UTERINE BLEEDING (AUB): Status: RESOLVED | Noted: 2024-11-07 | Resolved: 2025-06-06

## 2025-06-06 PROBLEM — R93.89 THICKENED ENDOMETRIUM: Status: RESOLVED | Noted: 2024-11-07 | Resolved: 2025-06-06

## 2025-06-06 LAB
B-HCG UR QL: POSITIVE
EXPIRATION DATE: ABNORMAL
INTERNAL NEGATIVE CONTROL: NEGATIVE
INTERNAL POSITIVE CONTROL: POSITIVE
Lab: ABNORMAL

## 2025-06-06 RX ORDER — CETIRIZINE HYDROCHLORIDE 5 MG/1
5 TABLET ORAL DAILY
COMMUNITY

## 2025-06-06 RX ORDER — DIPHENHYDRAMINE HYDROCHLORIDE 25 MG/1
25 CAPSULE ORAL DAILY
Qty: 90 TABLET | Refills: 3 | Status: SHIPPED | OUTPATIENT
Start: 2025-06-06

## 2025-06-06 NOTE — PROGRESS NOTES
"Subjective   Marce Galvan is a 20 y.o. female presents for TVUS for possible pregnancy.  Lmp 4/4/25 no complaints today.  She reports an episode of significant bleeding last week.  Ultrasound today is consistent with an intrauterine pregnancy at 6 weeks and 5 days and an estimated due date of January 25, 2026 with a heart rate of 126.  She has a 7 cm simple cyst on her left ovary.  Discussed with patient and her  that with any bleeding early in pregnancy there is a 50% chance of miscarriage.  I advised pelvic rest at this time.    Possible Pregnancy  Pertinent negative symptoms include no abdominal pain, no chills, no fatigue and no fever.       The following portions of the patient's history were reviewed and updated as appropriate: allergies, current medications, past family history, past medical history, past social history, past surgical history, and problem list.    Review of Systems   Constitutional:  Negative for chills, fatigue and fever.   Gastrointestinal:  Negative for abdominal pain.   Genitourinary:  Negative for pelvic pain, vaginal bleeding, vaginal discharge and vaginal pain.   All other systems reviewed and are negative.    /72   Ht 167.6 cm (65.98\")   Wt 115 kg (253 lb)   LMP 04/04/2025 (Exact Date)   BMI 40.86 kg/m²     Objective   Physical Exam  Vitals and nursing note reviewed.   Constitutional:       Appearance: Normal appearance. She is normal weight.   Pulmonary:      Effort: Pulmonary effort is normal.   Neurological:      Mental Status: She is alert and oriented to person, place, and time.   Psychiatric:         Mood and Affect: Mood normal.         Behavior: Behavior normal.         Assessment & Plan   Diagnoses and all orders for this visit:    1. Visit for confirmation of pregnancy test result with physical exam (Primary)  -     ABO / Rh  -     Antibody Screen  -     CBC & Differential  -     Hepatitis B Surface Antigen  -     Hepatitis C Antibody  -     HIV-1 / O / 2 " Ag / Antibody  -     RPR Qualitative with Reflex to Quant  -     Rubella Antibody, IgG  -     Urine Culture - Urine, Urine, Clean Catch  -     Varicella Zoster Antibody, IgG  -     Hemoglobin A1c  -     TSH  -     Chlamydia trachomatis, Neisseria gonorrhoeae, Trichomonas vaginalis, PCR - Urine, Urine, Clean Catch    2. Possible pregnancy, not confirmed  -     POC Pregnancy, Urine    3. Nausea/vomiting in pregnancy  -     vitamin B-6 (PYRIDOXINE) 25 MG tablet; Take 1 tablet by mouth Daily.  Dispense: 90 tablet; Refill: 3  -     doxylamine (UNISOM) 25 MG tablet; Take 1 tablet by mouth At Night As Needed for Nausea.  Dispense: 30 tablet; Refill: 3    4. Vaginal bleeding affecting early pregnancy       Counseling was given to patient and  for the following topics: diagnostic results, instructions for management, impressions, and importance of treatment compliance . Total time of the encounter was 30 minutes and 25 minutes was spent counseling, exclusive of ultrasound results.    Return in about 31 days (around 7/7/2025), or ob intake.

## 2025-06-08 LAB
ABO GROUP BLD: NORMAL
BACTERIA UR CULT: NORMAL
BACTERIA UR CULT: NORMAL
BASOPHILS # BLD AUTO: 0 X10E3/UL (ref 0–0.2)
BASOPHILS NFR BLD AUTO: 0 %
BLD GP AB SCN SERPL QL: NEGATIVE
C TRACH RRNA SPEC QL NAA+PROBE: NEGATIVE
EOSINOPHIL # BLD AUTO: 0 X10E3/UL (ref 0–0.4)
EOSINOPHIL NFR BLD AUTO: 0 %
ERYTHROCYTE [DISTWIDTH] IN BLOOD BY AUTOMATED COUNT: 12.4 % (ref 11.7–15.4)
HBA1C MFR BLD: 5.3 % (ref 4.8–5.6)
HBV SURFACE AG SERPL QL IA: NEGATIVE
HCT VFR BLD AUTO: 41.9 % (ref 34–46.6)
HCV IGG SERPL QL IA: NON REACTIVE
HGB BLD-MCNC: 13.5 G/DL (ref 11.1–15.9)
HIV 1+2 AB+HIV1 P24 AG SERPL QL IA: NON REACTIVE
IMM GRANULOCYTES # BLD AUTO: 0 X10E3/UL (ref 0–0.1)
IMM GRANULOCYTES NFR BLD AUTO: 0 %
LYMPHOCYTES # BLD AUTO: 2.2 X10E3/UL (ref 0.7–3.1)
LYMPHOCYTES NFR BLD AUTO: 24 %
MCH RBC QN AUTO: 29.3 PG (ref 26.6–33)
MCHC RBC AUTO-ENTMCNC: 32.2 G/DL (ref 31.5–35.7)
MCV RBC AUTO: 91 FL (ref 79–97)
MONOCYTES # BLD AUTO: 0.8 X10E3/UL (ref 0.1–0.9)
MONOCYTES NFR BLD AUTO: 8 %
N GONORRHOEA RRNA SPEC QL NAA+PROBE: NEGATIVE
NEUTROPHILS # BLD AUTO: 6.1 X10E3/UL (ref 1.4–7)
NEUTROPHILS NFR BLD AUTO: 68 %
PLATELET # BLD AUTO: 308 X10E3/UL (ref 150–450)
RBC # BLD AUTO: 4.61 X10E6/UL (ref 3.77–5.28)
RH BLD: POSITIVE
RPR SER QL: NON REACTIVE
RUBV IGG SERPL IA-ACNC: 6.68 INDEX
T VAGINALIS RRNA SPEC QL NAA+PROBE: NEGATIVE
TSH SERPL DL<=0.005 MIU/L-ACNC: 0.89 UIU/ML (ref 0.45–4.5)
VZV IGG SER QL IA: REACTIVE
WBC # BLD AUTO: 9.2 X10E3/UL (ref 3.4–10.8)

## 2025-07-07 ENCOUNTER — INITIAL PRENATAL (OUTPATIENT)
Dept: OBSTETRICS AND GYNECOLOGY | Age: 20
End: 2025-07-07
Payer: COMMERCIAL

## 2025-07-07 VITALS — SYSTOLIC BLOOD PRESSURE: 110 MMHG | WEIGHT: 246 LBS | DIASTOLIC BLOOD PRESSURE: 72 MMHG | BODY MASS INDEX: 39.72 KG/M2

## 2025-07-07 DIAGNOSIS — Z31.5 ENCOUNTER FOR PROCREATIVE GENETIC COUNSELING: ICD-10-CM

## 2025-07-07 DIAGNOSIS — O21.9 NAUSEA/VOMITING IN PREGNANCY: ICD-10-CM

## 2025-07-07 DIAGNOSIS — Z98.891 HISTORY OF 2 CESAREAN SECTIONS: ICD-10-CM

## 2025-07-07 DIAGNOSIS — Z34.81 PRENATAL CARE, SUBSEQUENT PREGNANCY IN FIRST TRIMESTER: Primary | ICD-10-CM

## 2025-07-07 DIAGNOSIS — Z34.81 ENCOUNTER FOR SUPERVISION OF OTHER NORMAL PREGNANCY IN FIRST TRIMESTER: ICD-10-CM

## 2025-07-07 DIAGNOSIS — N83.202 CYST OF LEFT OVARY: ICD-10-CM

## 2025-07-07 DIAGNOSIS — Z13.89 SCREENING FOR BLOOD OR PROTEIN IN URINE: ICD-10-CM

## 2025-07-07 PROBLEM — O34.219 PREVIOUS CESAREAN DELIVERY AFFECTING PREGNANCY: Status: ACTIVE | Noted: 2025-07-07

## 2025-07-07 PROBLEM — Z34.90 PREGNANCY: Status: ACTIVE | Noted: 2025-07-07

## 2025-07-07 RX ORDER — TRIAMCINOLONE ACETONIDE 55 UG/1
2 SPRAY, METERED NASAL DAILY
COMMUNITY
Start: 2025-06-09

## 2025-07-07 NOTE — PROGRESS NOTES
Chief Complaint   Patient presents with    Routine Prenatal Visit     Cc: ob intake , some nausea but manageable with meds , no other ob complaints , will do NIPT today , carrier screening neg back in  .       HPI: 20 y.o.  at 11w1d presents for prenatal care    Last OB US Data (since 2024)       None          Vitals:    25 1010   BP: 110/72   Weight: 112 kg (246 lb)     Total weight gain for pregnancy:  -3.175 kg (-7 lb)    Review of systems:   Gen: negative  CV:     negative  GI: nausea  :   negative  MS:    negative  Neuro: negative  Pul: negative    Physical Exam  Vitals and nursing note reviewed.   Constitutional:       Appearance: Normal appearance. She is obese.   Pulmonary:      Effort: Pulmonary effort is normal.   Neurological:      Mental Status: She is alert.   Psychiatric:         Mood and Affect: Mood normal.         Thought Content: Thought content normal.         Judgment: Judgment normal.           A/P  1. Intrauterine pregnancy at 11w1d   2. Pregnancy Risk:  NORMAL    Diagnoses and all orders for this visit:    1. Prenatal care, subsequent pregnancy in first trimester (Primary)    2. Screening for blood or protein in urine  -     POC Urinalysis Dipstick    3. Encounter for procreative genetic counseling  -     [facility] Prenatal Test: Chromosomes 13, 18, 21, X & Y: Triploidy 22Q.11.2 Deletion - Blood,    4. Encounter for supervision of other normal pregnancy in first trimester  -     [facility] Prenatal Test: Chromosomes 13, 18, 21, X & Y: Triploidy 22Q.11.2 Deletion - Blood,    5. Cyst of left ovary    6. History of 2  sections    7. Nausea/vomiting in pregnancy        Nutrition and weight gain were addressed.  -----------------------  PLAN:   Return in about 30 days (around 2025), or ob check.  OB intake completed   Desires NIPT  Prenatal labs reviewed        Glenis Bautista MD  2025 10:29 EDT

## 2025-08-06 ENCOUNTER — ROUTINE PRENATAL (OUTPATIENT)
Dept: OBSTETRICS AND GYNECOLOGY | Age: 20
End: 2025-08-06
Payer: COMMERCIAL

## 2025-08-06 VITALS — BODY MASS INDEX: 38.76 KG/M2 | WEIGHT: 240 LBS | SYSTOLIC BLOOD PRESSURE: 110 MMHG | DIASTOLIC BLOOD PRESSURE: 68 MMHG

## 2025-08-06 DIAGNOSIS — Z34.82 PRENATAL CARE, SUBSEQUENT PREGNANCY IN SECOND TRIMESTER: Primary | ICD-10-CM

## 2025-08-06 DIAGNOSIS — Z98.891 HISTORY OF 2 CESAREAN SECTIONS: ICD-10-CM

## 2025-08-06 DIAGNOSIS — O21.9 NAUSEA/VOMITING IN PREGNANCY: ICD-10-CM

## 2025-08-06 DIAGNOSIS — Z13.89 SCREENING FOR BLOOD OR PROTEIN IN URINE: ICD-10-CM

## 2025-08-06 LAB
BILIRUB BLD-MCNC: NEGATIVE MG/DL
GLUCOSE UR STRIP-MCNC: NEGATIVE MG/DL
KETONES UR QL: ABNORMAL
LEUKOCYTE EST, POC: NEGATIVE
NITRITE UR-MCNC: NEGATIVE MG/ML
PH UR: 7 [PH] (ref 5–8)
PROT UR STRIP-MCNC: ABNORMAL MG/DL
RBC # UR STRIP: ABNORMAL /UL
SP GR UR: 1.02 (ref 1–1.03)
UROBILINOGEN UR QL: ABNORMAL

## (undated) DEVICE — GLV SURG BIOGEL LTX PF 6 1/2

## (undated) DEVICE — SOL IRR H2O BTL 1000ML STRL

## (undated) DEVICE — SUT MNCRYL 0/0 CTX 36IN Y398H

## (undated) DEVICE — ANTIBACTERIAL UNDYED BRAIDED (POLYGLACTIN 910), SYNTHETIC ABSORBABLE SUTURE: Brand: COATED VICRYL

## (undated) DEVICE — 3M(TM) TEGADERM(TM) TRANSPARENT FILM DRESSING FRAME STYLE 1627: Brand: 3M™ TEGADERM™

## (undated) DEVICE — 3M™ STERI-STRIP™ REINFORCED ADHESIVE SKIN CLOSURES, R1548, 1 IN X 5 IN (25 MM X 125 MM), 4 STRIPS/ENVELOPE: Brand: 3M™ STERI-STRIP™

## (undated) DEVICE — KENDALL SCD EXPRESS SLEEVES, KNEE LENGTH, MEDIUM: Brand: KENDALL SCD

## (undated) DEVICE — SUT MNCRYL 3/0 CT1 36 IN Y944H